# Patient Record
Sex: MALE | Race: WHITE | NOT HISPANIC OR LATINO | Employment: FULL TIME | URBAN - METROPOLITAN AREA
[De-identification: names, ages, dates, MRNs, and addresses within clinical notes are randomized per-mention and may not be internally consistent; named-entity substitution may affect disease eponyms.]

---

## 2018-09-24 ENCOUNTER — OFFICE VISIT (OUTPATIENT)
Dept: FAMILY MEDICINE | Facility: CLINIC | Age: 28
End: 2018-09-24
Payer: COMMERCIAL

## 2018-09-24 VITALS
HEART RATE: 68 BPM | TEMPERATURE: 97.7 F | WEIGHT: 163.6 LBS | SYSTOLIC BLOOD PRESSURE: 130 MMHG | HEIGHT: 71 IN | DIASTOLIC BLOOD PRESSURE: 78 MMHG | BODY MASS INDEX: 22.9 KG/M2

## 2018-09-24 DIAGNOSIS — Z23 NEED FOR VACCINATION: ICD-10-CM

## 2018-09-24 DIAGNOSIS — M54.50 ACUTE LEFT-SIDED LOW BACK PAIN WITHOUT SCIATICA: ICD-10-CM

## 2018-09-24 DIAGNOSIS — Z00.00 ENCOUNTER FOR ROUTINE ADULT HEALTH EXAMINATION WITHOUT ABNORMAL FINDINGS: Primary | ICD-10-CM

## 2018-09-24 DIAGNOSIS — Z13.6 CARDIOVASCULAR SCREENING; LDL GOAL LESS THAN 160: ICD-10-CM

## 2018-09-24 DIAGNOSIS — Z82.69 FAMILY HISTORY OF ANKYLOSING SPONDYLITIS: ICD-10-CM

## 2018-09-24 PROCEDURE — 99213 OFFICE O/P EST LOW 20 MIN: CPT | Mod: 25 | Performed by: FAMILY MEDICINE

## 2018-09-24 PROCEDURE — 90472 IMMUNIZATION ADMIN EACH ADD: CPT | Performed by: FAMILY MEDICINE

## 2018-09-24 PROCEDURE — 90686 IIV4 VACC NO PRSV 0.5 ML IM: CPT | Performed by: FAMILY MEDICINE

## 2018-09-24 PROCEDURE — 90471 IMMUNIZATION ADMIN: CPT | Performed by: FAMILY MEDICINE

## 2018-09-24 PROCEDURE — 99385 PREV VISIT NEW AGE 18-39: CPT | Mod: 25 | Performed by: FAMILY MEDICINE

## 2018-09-24 PROCEDURE — 90715 TDAP VACCINE 7 YRS/> IM: CPT | Performed by: FAMILY MEDICINE

## 2018-09-24 NOTE — PROGRESS NOTES
SUBJECTIVE:   CC: Ki Menjivar is an 27 year old male who presents for preventative health visit.     Healthy Habits:    Do you get at least three servings of calcium containing foods daily (dairy, green leafy vegetables, etc.)? yes    Amount of exercise or daily activities, outside of work: 0 day(s) per week works as a     Problems taking medications regularly No    Medication side effects: No    Have you had an eye exam in the past two years? no    Do you see a dentist twice per year? yes    Do you have sleep apnea, excessive snoring or daytime drowsiness?no       Concerns:  * back pain    Pain in the low back, hurts most at the end of the day.  Started over the last month or so.  Had a 3 week period where it hurt daily.  Was off work for a week and pain was better.  Back to work again today and now has pain.  Pain in the L low back next to the spine.  No pain in the buttock or legs.  Felt stiff in the past when it was hurting, seemed stiff in the AM.  Did not disrupt sleep.  No numbness/tingling/weakness in the legs.  First episode of back pain.    Has a family h/o ankylosing spondylitis on both sides of the family    *  Planning adacel and flu        Today's PHQ-2 Score:   PHQ-2 ( 1999 Pfizer) 9/24/2018   Q1: Little interest or pleasure in doing things 0   Q2: Feeling down, depressed or hopeless 0   PHQ-2 Score 0       Abuse: Current or Past(Physical, Sexual or Emotional)- No  Do you feel safe in your environment - Yes    Social History   Substance Use Topics     Smoking status: Never Smoker     Smokeless tobacco: Never Used     Alcohol use Yes      If you drink alcohol do you typically have >3 drinks per day or >7 drinks per week? No                      Last PSA: No results found for: PSA    Reviewed orders with patient. Reviewed health maintenance and updated orders accordingly - Yes    Reviewed and updated as needed this visit by clinical staff  Tobacco  Allergies  Meds  Med Hx  Surg Hx  Fam  "Hx  Soc Hx        Reviewed and updated as needed this visit by Provider  Tobacco  Med Hx  Surg Hx  Fam Hx  Soc Hx       History reviewed. No pertinent past medical history.   Past Surgical History:   Procedure Laterality Date     LEG SURGERY Right     femur fracture       ROS:  CONSTITUTIONAL: NEGATIVE for fever, chills, change in weight  INTEGUMENTARY/SKIN: NEGATIVE for worrisome rashes, moles or lesions  EYES: NEGATIVE for vision changes or irritation  ENT: NEGATIVE for ear, mouth and throat problems  RESP: NEGATIVE for significant cough or SOB  CV: NEGATIVE for chest pain, palpitations or peripheral edema  GI: NEGATIVE for nausea, abdominal pain, heartburn, or change in bowel habits   male: negative for dysuria, hematuria, decreased urinary stream, erectile dysfunction, urethral discharge  MUSCULOSKELETAL:as above  NEURO: NEGATIVE for weakness, dizziness or paresthesias  PSYCHIATRIC: NEGATIVE for changes in mood or affect    OBJECTIVE:   /78  Pulse 68  Temp 97.7  F (36.5  C) (Tympanic)  Ht 5' 11.25\" (1.81 m)  Wt 163 lb 9.6 oz (74.2 kg)  BMI 22.66 kg/m2  EXAM:  GENERAL: healthy, alert and no distress  EYES: Eyes grossly normal to inspection, PERRL and conjunctivae and sclerae normal  HENT: ear canals and TM's normal, nose and mouth without ulcers or lesions  NECK: no adenopathy, no asymmetry, masses, or scars and thyroid normal to palpation  RESP: lungs clear to auscultation - no rales, rhonchi or wheezes  CV: regular rate and rhythm, normal S1 S2, no S3 or S4, no murmur, click or rub, no peripheral edema and peripheral pulses strong  ABDOMEN: soft, nontender, no hepatosplenomegaly, no masses and bowel sounds normal  MS: extremities normal- no gross deformities noted.  Lumbar spine with full ROM.  Mild pain on end range extension and rotation L.  nontender on palpation of the lumbar spine and SI micheal.  Mild L paraspinal tenderness.  Able to heel and toe walk.  Muscle strength intact in LE micheal at " 5/5  SKIN: no suspicious lesions or rashes  NEURO: Normal strength and tone, mentation intact and speech normal  PSYCH: mentation appears normal, affect normal/bright    Diagnostic Test Results:  none     ASSESSMENT/PLAN:       ICD-10-CM    1. Encounter for routine adult health examination without abnormal findings Z00.00    2. Acute left-sided low back pain without sciatica M54.5 XR Lumbar Spine 2/3 Views     HLA-B27 Typing   3. Family history of ankylosing spondylitis Z82.69 XR Lumbar Spine 2/3 Views     HLA-B27 Typing   4. Need for vaccination Z23 FLU VACCINE, SPLIT VIRUS, IM (QUADRIVALENT) [52235]- >3 YRS     Vaccine Administration, Initial [10480]     Vaccine Administration, Each Additional [98074]     TDAP, IM (10 - 64 YRS) - Adacel   5. CARDIOVASCULAR SCREENING; LDL GOAL LESS THAN 160 Z13.6 Lipid panel reflex to direct LDL Fasting     Reviewed low back pain.  Would recommend at least initial eval for ankylosing spondylitis given history..  Recommend PT as treatment.  Pt declines referral, considering chiropractor.  He will consider eval as ordered.  Patient Instructions   We updated your tetanus and whooping cough (pertussis) shot today as well as your flu shot.      I think it would be fine to check into a chiropractor for your back.  I'd recommend on who is rehab/exercise focused.    I am a bit concerned with your back pain given your age and family history.  I do think it would be a good idea to evaluate more for ankylosing spondylitis.  I would recommend an x-ray of the low back and a blood test called HLA B27.  You can schedule the x-ray any time and the blood test could be done at the same time as your cholesterol.    I would recommend you contact your insurance for pricing and you can call call the [facility] at [phone]        COUNSELING:  Reviewed preventive health counseling, as reflected in patient instructions  Special attention given to:        Regular exercise       Healthy  "diet/nutrition       Immunizations    Vaccinated for: Influenza and TDAP          BP Readings from Last 1 Encounters:   09/24/18 130/78     Estimated body mass index is 22.66 kg/(m^2) as calculated from the following:    Height as of this encounter: 5' 11.25\" (1.81 m).    Weight as of this encounter: 163 lb 9.6 oz (74.2 kg).    BP Screening:   Last 3 BP Readings:    BP Readings from Last 3 Encounters:   09/24/18 130/78       The following was recommended to the patient:  Re-screen BP within a year and recommended lifestyle modifications       reports that he has never smoked. He has never used smokeless tobacco.      Counseling Resources:  ATP IV Guidelines  Pooled Cohorts Equation Calculator  FRAX Risk Assessment  ICSI Preventive Guidelines  Dietary Guidelines for Americans, 2010  [employer] MyPlate  ASA Prophylaxis  Lung CA Screening    Eliza Borges, DO  [facility]    Patient Instructions   We updated your tetanus and whooping cough (pertussis) shot today as well as your flu shot.      I think it would be fine to check into a chiropractor for your back.  I'd recommend on who is rehab/exercise focused.    I am a bit concerned with your back pain given your age and family history.  I do think it would be a good idea to evaluate more for ankylosing spondylitis.  I would recommend an x-ray of the low back and a blood test called HLA B27.  You can schedule the x-ray any time and the blood test could be done at the same time as your cholesterol.    I would recommend you contact your insurance for pricing and you can call call the [employer] at [phone]      Preventive Health Recommendations  Male Ages 26 - 39    Yearly exam:             See your health care provider every year in order to  o   Review health changes.   o   Discuss preventive care.    o   Review your medicines if your doctor has prescribed any.    You should be tested each year for STDs (sexually transmitted diseases), if " you re at risk.     After age 35, talk to your provider about cholesterol testing. If you are at risk for heart disease, have your cholesterol tested at least every 5 years.     If you are at risk for diabetes, you should have a diabetes test (fasting glucose).  Shots: Get a flu shot each year. Get a tetanus shot every 10 years.     Nutrition:    Eat at least 5 servings of fruits and vegetables daily.     Eat whole-grain bread, whole-wheat pasta and brown rice instead of white grains and rice.     Get adequate Calcium and Vitamin D.     Lifestyle    Exercise for at least 150 minutes a week (30 minutes a day, 5 days a week). This will help you control your weight and prevent disease.     Limit alcohol to one drink per day.     No smoking.     Wear sunscreen to prevent skin cancer.     See your dentist every six months for an exam and cleaning.

## 2018-09-24 NOTE — LETTER
[facility]  [address] [place] [id number]  [phone]        October 9, 2019    Ki Menjivar  [address] [place] [address]              Dear Ki Menjivar    This is to remind you that your fasting lab is due.    You may call our office at [phone] to schedule an appointment.    Please disregard this notice if you have already had your labs drawn or made an appointment.        Sincerely,        Eliza Borges DO

## 2018-09-24 NOTE — NURSING NOTE
"Initial /78  Pulse 68  Temp 97.7  F (36.5  C) (Tympanic)  Ht 5' 11.25\" (1.81 m)  Wt 163 lb 9.6 oz (74.2 kg)  BMI 22.66 kg/m2 Estimated body mass index is 22.66 kg/(m^2) as calculated from the following:    Height as of this encounter: 5' 11.25\" (1.81 m).    Weight as of this encounter: 163 lb 9.6 oz (74.2 kg). .      "

## 2018-09-24 NOTE — PATIENT INSTRUCTIONS
We updated your tetanus and whooping cough (pertussis) shot today as well as your flu shot.      I think it would be fine to check into a chiropractor for your back.  I'd recommend on who is rehab/exercise focused.    I am a bit concerned with your back pain given your age and family history.  I do think it would be a good idea to evaluate more for ankylosing spondylitis.  I would recommend an x-ray of the low back and a blood test called HLA B27.  You can schedule the x-ray any time and the blood test could be done at the same time as your cholesterol.    I would recommend you contact your insurance for pricing and you can call call the [employer] at [phone]      Preventive Health Recommendations  Male Ages 26 - 39    Yearly exam:             See your health care provider every year in order to  o   Review health changes.   o   Discuss preventive care.    o   Review your medicines if your doctor has prescribed any.    You should be tested each year for STDs (sexually transmitted diseases), if you re at risk.     After age 35, talk to your provider about cholesterol testing. If you are at risk for heart disease, have your cholesterol tested at least every 5 years.     If you are at risk for diabetes, you should have a diabetes test (fasting glucose).  Shots: Get a flu shot each year. Get a tetanus shot every 10 years.     Nutrition:    Eat at least 5 servings of fruits and vegetables daily.     Eat whole-grain bread, whole-wheat pasta and brown rice instead of white grains and rice.     Get adequate Calcium and Vitamin D.     Lifestyle    Exercise for at least 150 minutes a week (30 minutes a day, 5 days a week). This will help you control your weight and prevent disease.     Limit alcohol to one drink per day.     No smoking.     Wear sunscreen to prevent skin cancer.     See your dentist every six months for an exam and cleaning.

## 2018-09-24 NOTE — MR AVS SNAPSHOT
After Visit Summary   9/24/2018    Ki Menjivar    MRN: [id number]           Patient Information     Date Of Birth          1990        Visit Information        Provider Department      9/24/2018 6:20 PM Eliza Borges, DO [facility]        Today's Diagnoses     Encounter for routine adult health examination without abnormal findings    -  1    CARDIOVASCULAR SCREENING; LDL GOAL LESS THAN 160        Need for vaccination        Acute left-sided low back pain without sciatica        Family history of ankylosing spondylitis          Care Instructions    We updated your tetanus and whooping cough (pertussis) shot today as well as your flu shot.      I think it would be fine to check into a chiropractor for your back.  I'd recommend on who is rehab/exercise focused.    I am a bit concerned with your back pain given your age and family history.  I do think it would be a good idea to evaluate more for ankylosing spondylitis.  I would recommend an x-ray of the low back and a blood test called HLA B27.  You can schedule the x-ray any time and the blood test could be done at the same time as your cholesterol.    I would recommend you contact your insurance for pricing and you can call call the [email or web address] at [phone]      Preventive Health Recommendations  Male Ages 26 - 39    Yearly exam:             See your health care provider every year in order to  o   Review health changes.   o   Discuss preventive care.    o   Review your medicines if your doctor has prescribed any.    You should be tested each year for STDs (sexually transmitted diseases), if you re at risk.     After age 35, talk to your provider about cholesterol testing. If you are at risk for heart disease, have your cholesterol tested at least every 5 years.     If you are at risk for diabetes, you should have a diabetes test (fasting glucose).  Shots: Get a flu shot each year. Get a tetanus shot every 10  "years.     Nutrition:    Eat at least 5 servings of fruits and vegetables daily.     Eat whole-grain bread, whole-wheat pasta and brown rice instead of white grains and rice.     Get adequate Calcium and Vitamin D.     Lifestyle    Exercise for at least 150 minutes a week (30 minutes a day, 5 days a week). This will help you control your weight and prevent disease.     Limit alcohol to one drink per day.     No smoking.     Wear sunscreen to prevent skin cancer.     See your dentist every six months for an exam and cleaning.             Follow-ups after your visit        Future tests that were ordered for you today     Open Future Orders        Priority Expected Expires Ordered    XR Lumbar Spine 2/3 Views Routine 9/24/2018 9/24/2019 9/24/2018    HLA-B27 Typing Routine 9/25/2018 9/24/2019 9/24/2018    Lipid panel reflex to direct LDL Fasting Routine 9/25/2018 9/24/2019 9/24/2018            Who to contact     Normal or non-critical lab and imaging results will be communicated to you by [employer], letter or phone within 4 business days after the clinic has received the results. If you do not hear from us within 7 days, please contact the clinic through [employer] or phone. If you have a critical or abnormal lab result, we will notify you by phone as soon as possible.  Submit refill requests through [employer] or call your pharmacy and they will forward the refill request to us. Please allow 3 business days for your refill to be completed.          If you need to speak with a  for additional information , please call: [phone]           Additional Information About Your Visit        [employer] Information     [employer] lets you send messages to your doctor, view your test results, renew your prescriptions, schedule appointments and more. To sign up, go to [email or web address] . Click on \"Log in\" on the left side of the screen, which will take you to the Welcome page. Then click on \"Sign up Now\" on the right " "side of the page.     You will be asked to enter the access code listed below, as well as some personal information. Please follow the directions to create your username and password.     Your access code is: [email or web address]  Expires: 2018  7:06 PM     Your access code will  in 90 days. If you need help or a new code, please call your [facility] clinic or [phone].        Care EveryWhere ID     This is your Care EveryWhere ID. This could be used by other organizations to access your [facility] medical records  [id number]        Your Vitals Were     Pulse Temperature Height BMI (Body Mass Index)          68 97.7  F (36.5  C) (Tympanic) 5' 11.25\" (1.81 m) 22.66 kg/m2         Blood Pressure from Last 3 Encounters:   18 130/78    Weight from Last 3 Encounters:   18 163 lb 9.6 oz (74.2 kg)              We Performed the Following     FLU VACCINE, SPLIT VIRUS, IM (QUADRIVALENT) [49417]- >3 YRS     TDAP, IM (10 - 64 YRS) - Adacel     Vaccine Administration, Each Additional [84309]     Vaccine Administration, Initial [62870]        Primary Care Provider Office Phone # Fax #    [facility] [phone] [address] [address]        Equal Access to Services     SHAN FOX AH: Hadii aad ku hadasho Soelmiraali, waaxda luqadaha, qaybta kaalmada adeegyada, hakeem taylor. So [facility] [phone].    ATENCIÓN: Si habla español, tiene a clements disposición servicios gratuitos de asistencia lingüística. Radha al [phone].    We comply with applicable federal civil rights laws and [place] laws. We do not discriminate on the basis of race, color, national origin, age, disability, sex, sexual orientation, or gender identity.            Thank you!     Thank you for choosing [facility]  for your care. Our goal is always to provide you with excellent care. Hearing back from our patients is one way we can continue to improve our " services. Please take a few minutes to complete the written survey that you may receive in the mail after your visit with us. Thank you!             Your Updated Medication List - Protect others around you: Learn how to safely use, store and throw away your medicines at www.disposemymeds.org.      Notice  As of 9/24/2018  7:06 PM    You have not been prescribed any medications.

## 2018-09-25 NOTE — PROGRESS NOTES

## 2019-12-06 ENCOUNTER — TELEPHONE (OUTPATIENT)
Dept: LAB | Facility: CLINIC | Age: 29
End: 2019-12-06

## 2019-12-06 NOTE — TELEPHONE ENCOUNTER
Labs on Ki Menjivar has  and a letter was sent on 10/9/19 . Please contact the patient. Thank you!

## 2020-03-10 ENCOUNTER — HEALTH MAINTENANCE LETTER (OUTPATIENT)
Age: 30
End: 2020-03-10

## 2020-08-01 ENCOUNTER — TRANSFERRED RECORDS (OUTPATIENT)
Dept: HEALTH INFORMATION MANAGEMENT | Facility: CLINIC | Age: 30
End: 2020-08-01

## 2020-08-31 ENCOUNTER — VIRTUAL VISIT (OUTPATIENT)
Dept: FAMILY MEDICINE | Facility: OTHER | Age: 30
End: 2020-08-31
Payer: COMMERCIAL

## 2020-08-31 PROCEDURE — 99421 OL DIG E/M SVC 5-10 MIN: CPT | Performed by: PHYSICIAN ASSISTANT

## 2020-09-01 ENCOUNTER — AMBULATORY - HEALTHEAST (OUTPATIENT)
Dept: FAMILY MEDICINE | Facility: CLINIC | Age: 30
End: 2020-09-01

## 2020-09-01 DIAGNOSIS — Z20.822 SUSPECTED COVID-19 VIRUS INFECTION: ICD-10-CM

## 2020-09-02 NOTE — PROGRESS NOTES
"Date: 2020 20:07:22  Clinician: Trista King  Clinician NPI: [id number]  Patient: Ki Hadley  Patient : 1990  Patient Address: [address] [place] [address]  Patient Phone: [phone]  Visit Protocol: URI  Patient Summary:  Ki is a 29 year old ( : 1990 ) male who initiated a Visit for COVID-19 (Coronavirus) evaluation and screening. When asked the question \"Please sign me up to receive news, health information and promotions from [employer].\", Ki responded \"No\".    Ki states his symptoms started 1-2 days ago.   His symptoms consist of a headache, chills, malaise, a cough, nasal congestion, and myalgia. Ki also feels feverish.   Symptom details     Nasal secretions: The color of his mucus is clear.    Cough: Ki coughs a few times an hour and his cough is not more bothersome at night. Phlegm does not come into his throat when he coughs. He does not believe his cough is caused by post-nasal drip.     Temperature: His current temperature is 98.5 degrees Fahrenheit.     Headache: He states the headache is mild (1-3 on a 10 point pain scale).      Ki denies having ear pain, enlarged lymph nodes, wheezing, sore throat, teeth pain, ageusia, diarrhea, vomiting, rhinitis, nausea, anosmia, and facial pain or pressure. He also denies having a sinus infection within the past year, having recent facial or sinus surgery in the past 60 days, and taking antibiotic medication in the past month. He is not experiencing dyspnea.   Precipitating events  He has recently been exposed to someone with influenza. Ki has been in close contact with the following high risk individuals: children under the age of 5.   Pertinent COVID-19 (Coronavirus) information  In the past 14 days, Ki has not worked in a congregate living setting.   He does not work or volunteer as healthcare worker or a  and does not work or volunteer in a healthcare facility.   Ki also has not lived in " a congregate living setting in the past 14 days. He lives with a healthcare worker.   Ki has not had a close contact with a laboratory-confirmed COVID-19 patient within 14 days of symptom onset.   Since December 2019, Ki and has not had upper respiratory infection or influenza-like illness. Has not been diagnosed with lab-confirmed COVID-19 test   Pertinent medical history  Ki needs a return to work/school note.   Weight: 160 lbs   Ki does not smoke or use smokeless tobacco.   Additional information as reported by the patient (free text): A cold has been going around my daughter's . She had a mild fever (99.2) Friday night, along with a runny nose (clear snot). Her fever was gone the next morning.   Weight: 160 lbs  Reason for repeat visit for the same protocol within 24 hours:  I haven't started a previous visit before. I am having technology problems.  See the History of referred by protocol and completed visits section for details on previous visits (visits currently in queue to be diagnosed will not appear in this section).    MEDICATIONS: No current medications, ALLERGIES: NKDA  Clinician Response:  Dear Ki,  Based on the information provided, you have a viral upper respiratory infection, otherwise known as a cold. Symptoms vary from person to person, but can include sneezing, coughing, a runny nose, sore throat, and headache and range from mild to severe.  Unfortunately, there are no medications that can cure a cold, so treatment is focused on controlling symptoms as much as possible. Most people gradually feel better until symptoms are gone in 1-2 weeks.  Medication information  Because you have a viral infection, antibiotics will not help you get better. Treating a viral infection with antibiotics could actually make you feel worse.  Unless you are allergic to the over-the-counter medication(s) below, I recommend using:       Acetaminophen (Tylenol or store brand) oral tablet. Take 1-2  tablets by mouth every 4-6 hours to help with the discomfort.      Ibuprofen (Advil or store brand) 200 mg oral tablet. Take 1-3 tablets (200-600 mg) by mouth every 8 hours to help with the discomfort. Make sure to take the ibuprofen with food. Do not exceed 2400 mg in 24 hours.    A decongestant such as Sudafed PE or store brand.     Over-the-counter medications do not require a prescription. Ask the pharmacist if you have any questions.  Self care  Steps you can take to be as comfortable as possible:     Rest.    Drink plenty of fluids.    Take a warm shower to loosen congestion    Use a cool-mist humidifier.    Take a spoonful of honey to reduce your cough.     When to seek care  Please be seen in a clinic or urgent care if any of the following occur:   New symptoms develop, or symptoms become worse   Call ahead before going to the clinic or urgent care.  Additional treatment plan   Your symptoms show that you may have coronavirus (COVID-19). This illness can cause fever, cough and trouble breathing. Many people get a mild case and get better on their own. Some people can get very sick.  What should I do?  We would like to test you for this virus.   1. Please call [phone] to schedule your visit. Explain that [facility] have [facility] COVID-19 test. Be ready to share your [facility] visit ID number.  The following will serve as your written order for this COVID Test, ordered by me, for the indication of suspected COVID [Z20.828]: The test will be ordered in [employer], our electronic health record, after you are scheduled. It will show as ordered and authorized by Anand Zamudio MD.  Order: COVID-19 (Coronavirus) PCR for SYMPTOMATIC testing from [facility].      2. When it's time for your COVID test:  Stay at least 6 feet away from others. (If someone will drive you to your test, stay in the backseat, as far away from the  as you can.)   Cover your mouth and nose with a mask, tissue or washcloth.  Go  "straight to the testing site. Don't make any stops on the way there or back.      3.Starting now: Stay home and away from others (self-isolate) until:   You've had no fever---and no medicine that reduces fever---for one full day (24 hours). And...   Your other symptoms have gotten better. For example, your cough or breathing has improved. And...   At least 10 days have passed since your symptoms started.       During this time, don't leave the house except for testing or medical care.   Stay in your own room, even for meals. Use your own bathroom if you can.   Stay away from others in your home. No hugging, kissing or shaking hands. No visitors.  Don't go to work, school or anywhere else.    Clean \"high touch\" surfaces often (doorknobs, counters, handles, etc.). Use a household cleaning spray or wipes. You'll find a full list of  on the EPA website: www.epa.gov/pesticide-registration/list-n-disinfectants-use-against-sars-cov-2.   Cover your mouth and nose with a mask, tissue or washcloth to avoid spreading germs.  Wash your hands and face often. Use soap and water.  Caregivers in these groups are at risk for severe illness due to COVID-19:  o People 65 years and older  o People who live in a nursing home or long-term care facility  o People with chronic disease (lung, heart, cancer, diabetes, kidney, liver, immunologic)  o People who have a weakened immune system, including those who:   Are in cancer treatment  Take medicine that weakens the immune system, such as corticosteroids  Had a bone marrow or organ transplant  Have an immune deficiency  Have poorly controlled HIV or AIDS  Are obese (body mass index of 40 or higher)  Smoke regularly   o Caregivers should wear gloves while washing dishes, handling laundry and cleaning bedrooms and bathrooms.  o Use caution when washing and drying laundry: Don't shake dirty laundry, and use the warmest water setting that you can.  o For more tips, go to " www.cdc.gov/coronavirus/2019-ncov/downloads/10Things.pdf.    4.Sign up for [employer]. We know it's scary to hear that you might have COVID-19. We want to track your symptoms to make sure you're okay over the next 2 weeks. Please look for an email from [employer]---this is a free, online program that we'll use to keep in touch. To sign up, follow the link in the email. Learn more at [email or web address]  How can I take care of myself?   Get lots of rest. Drink extra fluids (unless a doctor has told you not to).   Take Tylenol (acetaminophen) for fever or pain. If you have liver or kidney problems, ask your family doctor if it's okay to take Tylenol.   Adults can take either:    650 mg (two 325 mg pills) every 4 to 6 hours, or...   1,000 mg (two 500 mg pills) every 8 hours as needed.    Note: Don't take more than 3,000 mg in one day. Acetaminophen is found in many medicines (both prescribed and over-the-counter medicines). Read all labels to be sure you don't take too much.   For children, check the Tylenol bottle for the right dose. The dose is based on the child's age or weight.    If you have other health problems (like cancer, heart failure, an organ transplant or severe kidney disease): Call your specialty clinic if you don't feel better in the next 2 days.       Know when to call 911. Emergency warning signs include:    Trouble breathing or shortness of breath Pain or pressure in the chest that doesn't go away Feeling confused like you haven't felt before, or not being able to wake up Bluish-colored lips or face.  Where can I get more information?    [facility] -- About COVID-19: [email or web address]   CDC -- What to Do If You're Sick: www.cdc.gov/coronavirus/2019-ncov/about/steps-when-sick.html   CDC -- Ending Home Isolation: www.cdc.gov/coronavirus/2019-ncov/hcp/disposition-in-home-patients.html   CDC -- Caring for Someone:  www.cdc.gov/coronavirus/2019-ncov/if-you-are-sick/care-for-someone.html   [facility] -- Interim Guidance for Hospital Discharge to Home: www.health.[facility].mn.us/diseases/coronavirus/hcp/hospdischarge.pdf   [facility] clinical trials (COVID-19 research studies): clinicalaffairs.[facility]-clinical-trials    Below are the COVID-19 hotlines at the [place] Department of Health ([facility]). Interpreters are available.    For health questions: Call 010-308-3467 or 1-699.137.9210 (7 a.m. to 7 p.m.) For questions about schools and childcare: Call 751-016-8964 or 1-718.580.6419 (7 a.m. to 7 p.m.)    Diagnosis: Nasal congestion  Diagnosis ICD: R09.81

## 2020-11-10 ENCOUNTER — VIRTUAL VISIT (OUTPATIENT)
Dept: FAMILY MEDICINE | Facility: OTHER | Age: 30
End: 2020-11-10
Payer: COMMERCIAL

## 2020-11-10 PROCEDURE — 99421 OL DIG E/M SVC 5-10 MIN: CPT | Performed by: PHYSICIAN ASSISTANT

## 2020-11-10 NOTE — PROGRESS NOTES
"Date: 11/10/2020 10:14:57  Clinician: Juan Mason  Clinician NPI: [id number]  Patient: Ki Hadley  Patient : 1990  Patient Address: [address] [place] [id number]  Patient Phone: [phone]  Visit Protocol: URI  Patient Summary:  Ki is a 29 year old ( : 1990 ) male who initiated a [facility] Visit for COVID-19 (Coronavirus) evaluation and screening. When asked the question \"Please sign me up to receive news, health information and promotions from [facility].\", Ki responded \"Yes\".    When asked when his symptoms started, Ki reported that he does not have any symptoms.   He denies taking antibiotic medication in the past month and having recent facial or sinus surgery in the past 60 days.    Pertinent COVID-19 (Coronavirus) information  Ki does not work or volunteer as healthcare worker or a . In the past 14 days, Ki has not worked or volunteered at a healthcare facility or group living setting.   In the past 14 days, he also has not lived in a congregate living setting.   Ki has had a close contact with a laboratory-confirmed COVID-19 patient in the last 14 days. He was not exposed at his work. Date Ki was exposed to the laboratory-confirmed COVID-19 patient: 2020   Additional information about contact with COVID-19 (Coronavirus) patient as reported by the patient (free text): brother in law at his house   Ki is not living in the same household with the COVID-19 positive patient. He was in an enclosed space for greater than 15 minutes with the COVID-19 patient.   During the encounter, neither were wearing masks.   Since 2019, Ki has been tested for COVID-19 and has had upper respiratory infection (URI) or influenza-like illness.      Result of COVID-19 test: Negative     Date of his COVID-19 test: 08/10/2020     Date(s) of previous URI or influenza-like illness (free-text): 8/10/2020     Symptoms Ki experienced during previous URI or " influenza-like illness as reported by the patient (free-text): runny nose, lack of energy, cough        Pertinent medical history  Ki does not need a return to work/school note.   Weight: 165 lbs   Ki does not smoke or use smokeless tobacco.   Additional information as reported by the patient (free text): Daughter had a fever Friday through Sunday (11/6-8) but has gotten better since then.   Weight: 165 lbs    MEDICATIONS: No current medications, ALLERGIES: NKDA  Clinician Response:  Dear Ki,   Based on your exposure to COVID-19 (coronavirus), we would like to test you for this virus.  1. Please call [phone] to schedule your visit. Explain that you were referred by [facility] to have a COVID-19 test. Be ready to share your [facility] visit ID number.  * If you need to schedule in [facility] please call [phone] or for [employer] employees please call [phone].   * If you need to schedule in the [place] area please call [phone]. [employer] employees call [phone].   The following will serve as your written order for this COVID Test, ordered by me, for the indication of suspected COVID [Z20.828]: The test will be ordered in [employer], our electronic health record, after you are scheduled. It will show as ordered and authorized by Anand Zamudio MD.  Order: COVID-19 (coronavirus) PCR for ASYMPTOMATIC EXPOSURE testing from [facility].   If you know you have had close contact with someone who tested positive, you should be quarantined for 14 days after this exposure. You should stay in quarantine for the14 days even if the covid test is negative, the optimal time to test after exposure is 5-7 days from the exposure  Quarantine means   What should I do?  For safety, it's very important to follow these rules. Do this for 14 days after the date you were last exposed to the virus..  Stay home and away from others. Don't go to school or anywhere else. Generally quarantine means staying home from work but there are  some exceptions to this. Please contact your workplace.   No hugging, kissing or shaking hands.  Don't let anyone visit.  Cover your mouth and nose with a mask, tissue or washcloth to avoid spreading germs.  Wash your hands and face often. Use soap and water.  What are the symptoms of COVID-19?  The most common symptoms are cough, fever and trouble breathing. Less common symptoms include headache, body aches, fatigue (feeling very tired), chills, sore throat, stuffy or runny nose, diarrhea (loose poop), loss of taste or smell, belly pain, and nausea or vomiting (feeling sick to your stomach or throwing up).  After 14 days, if you have still don't have symptoms, you likely don't have this virus.  If you develop symptoms, follow these guidelines.  If you're normally healthy: Please start another [facility] visit to report your symptoms. Go to [email or web address].  If you have a serious health problem (like cancer, heart failure, an organ transplant or kidney disease): Call your specialty clinic. Let them know that you might have COVID-19.  2. When it's time for your COVID test:  Stay at least 6 feet away from others. (If someone will drive you to your test, stay in the backseat, as far away from the  as you can.)  Cover your mouth and nose with a mask, tissue or washcloth.  Go straight to the testing site. Don't make any stops on the way there or back.  Please note  Caregivers in these groups are at risk for severe illness due to COVID-19:  o People 65 years and older  o People who live in a nursing home or long-term care facility  o People with chronic disease (lung, heart, cancer, diabetes, kidney, liver, immunologic)  o People who have a weakened immune system, including those who:  Are in cancer treatment  Take medicine that weakens the immune system, such as corticosteroids  Had a bone marrow or organ transplant  Have an immune deficiency  Have poorly controlled HIV or AIDS  Are obese (body mass index of 40 or higher)   Smoke regularly  Where can I get more information?  [facility] -- About COVID-19: [email or web address]  CDC -- What to Do If You're Sick: www.cdc.gov/coronavirus/2019-ncov/about/steps-when-sick.html  CDC -- Ending Home Isolation: www.cdc.gov/coronavirus/2019-ncov/hcp/disposition-in-home-patients.html  [facility] -- Caring for Someone: www.cdc.gov/coronavirus/2019-ncov/if-you-are-sick/care-for-someone.html  [facility] -- Interim Guidance for Hospital Discharge to Home: www.[facility].mn.us/diseases/coronavirus/hcp/hospdischarge.pdf  [facility] clinical trials (COVID-19 research studies): clinicalaffairs.[facility]-clinical-trials  Below are the COVID-19 hotlines at the [place] Department of Health ([facility]). Interpreters are available.  For health questions: Call 147-239-5124 or 1-289.579.8040 (7 a.m. to 7 p.m.)  For questions about schools and childcare: Call 968-850-1006 or 1-577.269.1054 (7 a.m. to 7 p.m.)    Diagnosis: Contact with and (suspected) exposure to other viral communicable diseases  Diagnosis ICD: Z20.828

## 2020-12-01 ENCOUNTER — VIRTUAL VISIT (OUTPATIENT)
Dept: URGENT CARE | Facility: CLINIC | Age: 30
End: 2020-12-01
Payer: COMMERCIAL

## 2020-12-01 DIAGNOSIS — Z20.822 EXPOSURE TO COVID-19 VIRUS: Primary | ICD-10-CM

## 2020-12-01 PROCEDURE — 99213 OFFICE O/P EST LOW 20 MIN: CPT | Mod: TEL | Performed by: INTERNAL MEDICINE

## 2020-12-01 NOTE — LETTER
December 1, 2020      Ki Menjivar  [address] [place] [address]        To Whom It May Concern:    Ki Menjivar was evaluated by our clinic. He may return to work without restrictions on 12/2/2020.      Sincerely,      Viky Cobb MD

## 2020-12-02 NOTE — PATIENT INSTRUCTIONS
Ki has been evaluated by our clinic and may return to work with no restrictions on 12/2/2020.      -Viky Cobb MD

## 2020-12-02 NOTE — PROGRESS NOTES
"  Ki Menjivar is a 29 year old male who is being evaluated via a billable telephone visit.      The patient has been notified of following:     \"This telephone visit will be conducted via a phone call between you and your physician/provider. We have found that certain health care needs can be provided without the need for a physical exam.  This service lets us provide the care you need with a phone conversation.  If a prescription is necessary we can send it directly to your pharmacy.  If lab work is needed we can place an order for that and you can then stop by our lab to have the test done at a later time.    If during the course of the call the physician/provider feels a phone visit is not appropriate, you will not be charged for this service.\"     Patient has given verbal consent for telephone visit?  Yes    SUBJECTIVE:  Ki Menjivar is an 29 year old male who presents for beginning of November wife got covid, so he was exposed.  Did isolation since 11/9/2020 and needs note to come back to work.  He had a test which was negative for covid initially when wife got covid and then again 2 weeks later.  Not having any sxs.  No cough or runny nose or fevers.  No body aches.    PMH:   has no past medical history on file.  Patient Active Problem List   Diagnosis     Family history of ankylosing spondylitis     Social History     Socioeconomic History     Marital status:      Spouse name: Not on file     Number of children: Not on file     Years of education: Not on file     Highest education level: Not on file   Occupational History     Not on file   Social Needs     Financial resource strain: Not on file     Food insecurity     Worry: Not on file     Inability: Not on file     Transportation needs     Medical: Not on file     Non-medical: Not on file   Tobacco Use     Smoking status: Never Smoker     Smokeless tobacco: Never Used   Substance and Sexual Activity     Alcohol use: Yes     Drug use: No     Sexual " activity: Yes     Partners: Female   Lifestyle     Physical activity     Days per week: Not on file     Minutes per session: Not on file     Stress: Not on file   Relationships     Social connections     Talks on phone: Not on file     Gets together: Not on file     Attends Islam service: Not on file     Active member of club or organization: Not on file     Attends meetings of clubs or organizations: Not on file     Relationship status: Not on file     Intimate partner violence     Fear of current or ex partner: Not on file     Emotionally abused: Not on file     Physically abused: Not on file     Forced sexual activity: Not on file   Other Topics Concern     Parent/sibling w/ CABG, MI or angioplasty before 65F 55M? Not Asked   Social History Narrative     Not on file     Family History   Problem Relation Age of Onset     Ankylosing Spondylitis Paternal Grandmother      Diabetes Paternal Grandfather      Coronary Artery Disease Maternal Grandfather      Ankylosing Spondylitis Other      Colon Cancer No family hx of      Prostate Cancer No family hx of        ALLERGIES:  Patient has no known allergies.    No current outpatient medications on file.     No current facility-administered medications for this visit.          ROS:  ROS is done and is negative for general/constitutional, eye, ENT, Respiratory, cardiovascular, GI, , Skin, musculoskeletal except as noted elsewhere.  All other review of systems negative except as noted elsewhere.      OBJECTIVE:  There were no vitals taken for this visit.  GENERAL : Alert and oriented.  Did not seem to be in distress.   RESP: No respiratory distress detected during phone conversation         ASSESSMENT/PLAN:    ASSESSMENT / PLAN:  (Z20.828) Exposure to COVID-19 virus  (primary encounter diagnosis)  Comment: wife had covid in early November.  Plan: work note done for pt and advised to access on [employer].          See [facility] for orders, medications, letters, patient  instructions    Viky Cobb MD  12/1/2020, 6:07 PM      Phone call duration:  7 minutes

## 2020-12-21 ENCOUNTER — OFFICE VISIT (OUTPATIENT)
Dept: FAMILY MEDICINE | Facility: CLINIC | Age: 30
End: 2020-12-21
Payer: COMMERCIAL

## 2020-12-21 VITALS
BODY MASS INDEX: 23.43 KG/M2 | WEIGHT: 173 LBS | HEART RATE: 66 BPM | OXYGEN SATURATION: 97 % | HEIGHT: 72 IN | RESPIRATION RATE: 16 BRPM | SYSTOLIC BLOOD PRESSURE: 124 MMHG | TEMPERATURE: 96.8 F | DIASTOLIC BLOOD PRESSURE: 70 MMHG

## 2020-12-21 DIAGNOSIS — F41.9 ANXIETY: ICD-10-CM

## 2020-12-21 DIAGNOSIS — G47.00 INSOMNIA, UNSPECIFIED TYPE: ICD-10-CM

## 2020-12-21 DIAGNOSIS — Z00.00 ROUTINE GENERAL MEDICAL EXAMINATION AT A HEALTH CARE FACILITY: Primary | ICD-10-CM

## 2020-12-21 PROCEDURE — 99213 OFFICE O/P EST LOW 20 MIN: CPT | Mod: 25 | Performed by: FAMILY MEDICINE

## 2020-12-21 PROCEDURE — 99395 PREV VISIT EST AGE 18-39: CPT | Performed by: FAMILY MEDICINE

## 2020-12-21 ASSESSMENT — ANXIETY QUESTIONNAIRES
6. BECOMING EASILY ANNOYED OR IRRITABLE: MORE THAN HALF THE DAYS
1. FEELING NERVOUS, ANXIOUS, OR ON EDGE: MORE THAN HALF THE DAYS
IF YOU CHECKED OFF ANY PROBLEMS ON THIS QUESTIONNAIRE, HOW DIFFICULT HAVE THESE PROBLEMS MADE IT FOR YOU TO DO YOUR WORK, TAKE CARE OF THINGS AT HOME, OR GET ALONG WITH OTHER PEOPLE: SOMEWHAT DIFFICULT
2. NOT BEING ABLE TO STOP OR CONTROL WORRYING: SEVERAL DAYS
7. FEELING AFRAID AS IF SOMETHING AWFUL MIGHT HAPPEN: SEVERAL DAYS
GAD7 TOTAL SCORE: 9
5. BEING SO RESTLESS THAT IT IS HARD TO SIT STILL: SEVERAL DAYS
3. WORRYING TOO MUCH ABOUT DIFFERENT THINGS: SEVERAL DAYS

## 2020-12-21 ASSESSMENT — PATIENT HEALTH QUESTIONNAIRE - PHQ9
5. POOR APPETITE OR OVEREATING: SEVERAL DAYS
SUM OF ALL RESPONSES TO PHQ QUESTIONS 1-9: 13

## 2020-12-21 ASSESSMENT — MIFFLIN-ST. JEOR: SCORE: 1791.69

## 2020-12-21 NOTE — PROGRESS NOTES
SUBJECTIVE:   CC: Ki Menjivar is an 30 year old male who presents for preventative health visit.     Patient has been advised of split billing requirements and indicates understanding: Yes  Healthy Habits:    Getting at least 3 servings of Calcium per day:  Yes    Bi-annual eye exam:  NO    Dental care twice a year:  Yes    Sleep apnea or symptoms of sleep apnea:  Daytime drowsiness    Diet:  Regular (no restrictions)    Frequency of exercise:  None    Duration of exercise:  N/A    Taking medications regularly:  Not Applicable    Barriers to taking medications:  Not applicable    Medication side effects:  Not applicable    PHQ-2 Total Score:    Additional concerns today:  Yes (Mental Health and Sleep issues.)  - Patient sees a therapist [facility] in [place] and [facility] in [facility].  - If time patient has some other problems he would like to address. Back pain and gut issues.    Abnormal Mood Symptoms  Onset: ongoing for about 2 years. Patient sees two therapist. Sexual health therapist and Family therapist.    Description:   Depression: YES  Anxiety: YES    Accompanying Signs & Symptoms:  Still participating in activities that you used to enjoy: YES  Fatigue: YES  Irritability: YES- slightly  Difficulty concentrating: YES- sometimes  Changes in appetite: no  Problems with sleep: YES- falling asleep and throughout the day he is exhausted.  Heart racing/beating fast : YES- sometimes  Thoughts of hurting yourself or others: none    History:   Recent stress: YES- work.  Prior depression hospitalization: in the past yes.  Family history of depression: no  Family history of anxiety: no    Precipitating factors:   Alcohol/drug use: no    Alleviating factors:  none    Therapies Tried and outcome: None    Last year had bigger depression episode, which isn't as big an issue now.    No elisa, no OCD, no auditory or visual hallucinations.    No prior history of medications.      Insomnia  Onset: x  years (10+)    Description:   Time to fall asleep (sleep latency):  Up about 2 hours but sometimes it can be minutes depending on the day.  Middle of night awakening:  YES- but has a daughter that wakes up in the middle of the night so he is not necessarily waking up his self.   Early morning awakening:  no    Progression of Symptoms:  worsening    Accompanying Signs & Symptoms:  Daytime sleepiness/napping: YES  Excessive snoring/apnea: no  Restless legs: YES- from time to time  Frequent urination: YES  Chronic pain:  YES- does work a really physical job Back pain.    History:  Prior Insomnia: YES- from quite a while.    Precipitating factors:   New stressful situation: no  Caffeine intake: YES- Soda. Can be pretty excessive at times.  OTC decongestants: no  Any new medications: YES- Melatonin and Vitamin D.    Alleviating factors:  Self medicating (alcohol, etc.):  no    Therapies Tried and outcome: Melatonin     Hours of sleep 4-6 hours of sleep average, one year ago even less.    Today's PHQ-2 Score:   PHQ-2 ( 1999 Pfizer) 9/24/2018   Q1: Little interest or pleasure in doing things 0   Q2: Feeling down, depressed or hopeless 0   PHQ-2 Score 0       Abuse: Current or Past(Physical, Sexual or Emotional)- No  Do you feel safe in your environment? Yes        Social History     Tobacco Use     Smoking status: Never Smoker     Smokeless tobacco: Never Used   Substance Use Topics     Alcohol use: Yes     If you drink alcohol do you typically have >3 drinks per day or >7 drinks per week? No    No flowsheet data found.No flowsheet data found.    Last PSA: No results found for: PSA    Reviewed orders with patient. Reviewed health maintenance and updated orders accordingly - Yes  BP Readings from Last 3 Encounters:   12/21/20 124/70   09/24/18 130/78    Wt Readings from Last 3 Encounters:   12/21/20 78.5 kg (173 lb)   09/24/18 74.2 kg (163 lb 9.6 oz)                    Reviewed and updated as needed this visit by clinical  "staff  Tobacco  Allergies  Meds   Med Hx  Surg Hx  Fam Hx  Soc Hx        Reviewed and updated as needed this visit by Provider                    Review of Systems  CONSTITUTIONAL: NEGATIVE for fever, chills, change in weight  INTEGUMENTARY/SKIN: NEGATIVE for worrisome rashes, moles or lesions  EYES: NEGATIVE for vision changes or irritation  ENT: NEGATIVE for ear, mouth and throat problems  RESP: NEGATIVE for significant cough or SOB  CV: NEGATIVE for chest pain, palpitations or peripheral edema  GI: NEGATIVE for nausea, abdominal pain, heartburn, or change in bowel habits   male: negative for dysuria, hematuria, decreased urinary stream, erectile dysfunction, urethral discharge  MUSCULOSKELETAL: NEGATIVE for significant arthralgias or myalgia  NEURO: NEGATIVE for weakness, dizziness or paresthesias  PSYCHIATRIC: NEGATIVE for changes in mood or affect    OBJECTIVE:   /70   Pulse 66   Temp 96.8  F (36  C) (Tympanic)   Resp 16   Ht 1.835 m (6' 0.25\")   Wt 78.5 kg (173 lb)   SpO2 97%   BMI 23.30 kg/m      Physical Exam  GENERAL: healthy, alert and no distress  EYES: Eyes grossly normal to inspection, PERRL and conjunctivae and sclerae normal  HENT: ear canals and TM's normal, nose and mouth without ulcers or lesions  NECK: no adenopathy, no asymmetry, masses, or scars and thyroid normal to palpation  RESP: lungs clear to auscultation - no rales, rhonchi or wheezes  CV: regular rate and rhythm, normal S1 S2, no S3 or S4, no murmur, click or rub, no peripheral edema and peripheral pulses strong  ABDOMEN: soft, nontender, no hepatosplenomegaly, no masses and bowel sounds normal  MS: no gross musculoskeletal defects noted, no edema  SKIN: no suspicious lesions or rashes  NEURO: Normal strength and tone, mentation intact and speech normal  PSYCH: mentation appears normal, affect normal/bright    Diagnostic Test Results:  Labs reviewed in Epic    ASSESSMENT/PLAN:   Ki was seen today for " "physical.    Diagnoses and all orders for this visit:    Routine general medical examination at a health care facility    Insomnia, unspecified type: discussed the online CBT    Anxiety: discussed and plan therapy CBT        Patient has been advised of split billing requirements and indicates understanding: Yes  COUNSELING:   Reviewed preventive health counseling, as reflected in patient instructions       Regular exercise       Healthy diet/nutrition       Vision screening    Estimated body mass index is 23.3 kg/m  as calculated from the following:    Height as of this encounter: 1.835 m (6' 0.25\").    Weight as of this encounter: 78.5 kg (173 lb).     Wt Readings from Last 4 Encounters:   12/21/20 78.5 kg (173 lb)   09/24/18 74.2 kg (163 lb 9.6 oz)           He reports that he has never smoked. He has never used smokeless tobacco.      Counseling Resources:  ATP IV Guidelines  Pooled Cohorts Equation Calculator  FRAX Risk Assessment  ICSI Preventive Guidelines  Dietary Guidelines for Americans, 2010  USDA's MyPlate  ASA Prophylaxis  Lung CA Screening    Artemio Harper MD  [facility]  "

## 2020-12-21 NOTE — PATIENT INSTRUCTIONS
Apps for relaxation  CALM  Headspace    OK to continue melatonin for sleep up to 10mg max a night.  Decrease caffeine to 2 pops a day.    Discussed medications and plan no medication at this point for mood, plan therapy and self care, improved sleep.    Preventive Health Recommendations  Male Ages 26 - 39    Yearly exam:             See your health care provider every year in order to  o   Review health changes.   o   Discuss preventive care.    o   Review your medicines if your doctor has prescribed any.    You should be tested each year for STDs (sexually transmitted diseases), if you re at risk.     After age 35, talk to your provider about cholesterol testing. If you are at risk for heart disease, have your cholesterol tested at least every 5 years.     If you are at risk for diabetes, you should have a diabetes test (fasting glucose).  Shots: Get a flu shot each year. Get a tetanus shot every 10 years.     Nutrition:    Eat at least 5 servings of fruits and vegetables daily.     Eat whole-grain bread, whole-wheat pasta and brown rice instead of white grains and rice.     Get adequate Calcium and Vitamin D.     Lifestyle    Exercise for at least 150 minutes a week (30 minutes a day, 5 days a week). This will help you control your weight and prevent disease.     Limit alcohol to one drink per day.     No smoking.     Wear sunscreen to prevent skin cancer.     See your dentist every six months for an exam and cleaning.     Patient Education     Insomnia  Insomnia is repeated difficulty going to sleep or staying asleep, or both. Whether you have insomnia is not defined by a specific amount of sleep. Different people need different amounts of sleep, and you may need more or less sleep at different times of your life.  There are 3 major types of insomnia: short-term, chronic, and  other.  Short-term, or acute insomnia lasts less than 3 months. The symptoms are temporary and can be linked directly to a stressor, such  as the death of a loved one, financial problems, or a new physical problem. Short-term insomnia stops when the stressor resolves or the person adapts to its presence.  Chronic insomnia occurs at least 3 times a week and lasts longer than 3 months. Chronic insomnia can occur when either the cause of the sleeping problem is not clear, or the insomnia does not get better when the stressor is resolved. A number of other criteria are also used to make the diagnosis of chronic insomnia.    Other insomnia  is the third type of insomnia-related sleep disorders. This description applies to people who have problems getting to sleep or staying asleep, but don't meet all of the factors that describe either short-term or chronic insomnia.    Many things cause insomnia. Different people may have different causes. It can be from an underlying medical or psychological condition, or lifestyle. It can also be primary insomnia, which means no cause can be found.  Causes of insomnia include:    Chronic medical problems- heart disease, gastrointestinal problems, hormonal changes, breathing problems    Anxiety    Stress    Depression    Pain    Work schedule    Sleep apnea    Illegal drugs    Certain medicines  Many different medicines can affect your sleep, such as stimulants, caffeine, alcohol, some decongestants, and diet pills. Other medicines may include some types of blood pressure pills, steroids, asthma medicines, antihistamines, antidepressants, seizure medicines and statins. Not all of these will affect your sleep, and they shouldn t be stopped without talking to your doctor.  Symptoms of insomnia can include:    Lying awake for long periods at night before falling asleep    Waking up several times during the night    Waking up early in the morning and not being able to get back to sleep    Feeling tired and not refreshed by sleep    Not being able to function properly during the day and finding it hard to  concentrate    Irritability    Tiredness and fatigue during the day  Home care    Review your medicines with your doctor or pharmacist to find out if they can cause insomnia. Not all medicines will affect your sleep, but they shouldn't be stopped without reviewing them with your doctor. There may be serious side effects and consequences from suddenly stopping your medicines. Not taking them may cause strokes, heart attacks, and many other problems.    Caffeine, smoking, and alcohol also affect sleep. Limit your daily use and don't use these before bedtime. Alcohol may make you sleepy at first, but as its effects wear off, you may awaken a few hours later and have trouble returning to sleep.    Don't exercise, eat or drink large amounts of liquid within 2 hours of your bedtime.    Improve your sleep habits. Have a fixed bed and wake-up time. Try to keep noise, light and heat in your bedroom at a comfortable level. Try using earplugs or eyeshades if needed.     Don't watch TV in bed.    If you don't fall asleep within 30 minutes, try to relax by reading or listening to soft music.    Limit daytime napping to one 30 minute period, early in the day.    Get regular exercise. Find other ways to lessen your stress level.    If a medicine was prescribed to help reset your sleep patterns, take it as directed. Sleeping pills are intended for short-term use, only. If taken for too long, the effect wears off while the risk of physical addiction and psychological dependence increases.  Sleep diary  If the cause isn t obvious and it is not improving, try keeping a  sleep diary  for a couple of weeks. Include in it:    The time you go to bed    How long it takes to fall asleep    How many times you wake up    What time you wake up    Your meal times and what you eat    What time you drink alcohol and caffeine    Your exercise habits and times  Follow-up care  Follow up with your healthcare provider, or as advised. If an X-ray or  CT scan was done, you will be notified if there is a change in the reading, especially if it affects treatment.  Call 911  Call 911 if any of these occur:    Trouble breathing    Confusion or trouble waking    Fainting or loss of consciousness    Rapid heart rate    New chest, arm, shoulder, neck or upper back pain    Trouble with speech or vision, weakness of an arm or leg    Trouble walking or talking, loss of balance, numbness or weakness in one side of your body, facial droop  When to seek medical advice  Call your healthcare provider right away if any of these occur:    Extreme restlessness or irritability    Confusion or hallucinations (seeing or hearing things that are not there)    Anxiety, depression    Several days without sleeping  [employer] last reviewed this educational content on 5/1/2018 2000-2020 [employer]. [address] [place] [address], [place] [address]. All rights reserved. This information is not intended as a substitute for professional medical care. Always follow your healthcare professional's instructions.           Patient Education     Treating Anxiety Disorders with Therapy    If you have an anxiety disorder, you don t have to suffer anymore. Treatment is available. Therapy (also called counseling) is often a helpful treatment for anxiety disorders. With therapy, a specially trained professional (therapist) helps you face and learn to manage your anxiety. Therapy can be short-term or long-term depending on your needs. In some cases, medicine may also be prescribed with therapy. It may take time before you notice how much therapy is helping, but stick with it. With therapy, you can feel better.  Cognitive behavioral therapy (CBT)  Cognitive behavioral therapy (CBT) teaches you to manage anxiety. It does this by helping you understand how you think and act when you re anxious. Research has shown CBT to be a very effective treatment for anxiety disorders. How CBT is run is almost  like a class. It involves homework and activities to build skills that teach you to cope with anxiety step by step. It can be done in a group or one-on-one, and often takes place for a set number of sessions. CBT has two main parts:    Cognitive therapy helps you identify the negative, irrational thoughts that occur with your anxiety. You ll learn to replace these with more positive, realistic thoughts.    Behavioral therapy helps you change how you react to anxiety. You ll learn coping skills and methods for relaxing to help you better deal with anxiety.  Other forms of therapy  Other therapy methods may work better for you than CBT. Or, you may move from CBT to another form of therapy as your treatment needs change. This may mean meeting with a therapist by yourself or in a group. Therapy can also help you work through problems in your life, such as drug or alcohol dependence, that may be making your anxiety worse.  Getting better takes time  Therapy will help you feel better and teach you skills to help manage anxiety long term. But change doesn t happen right away. It takes a commitment from you. And treatment only works if you learn to face the causes of your anxiety. So, you might feel worse before you feel better. This can sometimes make it hard to stick with it. But remember: Therapy is a very effective treatment. The results will be well worth it.  Helping yourself  If anxiety is wearing you down, here are some things you can do to cope:    Check with your doctor and rule out any physical problems that may be causing the anxiety symptoms.    If an anxiety disorder is diagnosed, seek mental healthcare. This is an illness and it can respond to treatment. Most types of anxiety disorders will respond to talk therapy and medicine.    Educate yourself about anxiety disorders. Keep track of helpful online resources and books you can use during stressful periods.    Try stress management techniques such as  meditation.    Consider online or in-person support groups.    Don t fight your feelings. Anxiety feeds itself. The more you worry about it, the worse it gets. Instead, try to identify what might have triggered your anxiety. Then try to put this threat in perspective.    Keep in mind that you can t control everything about a situation. Change what you can and let the rest take its course.    Exercise -- it s a great way to relieve tension and help your body feel relaxed.    Examine your life for stress, and try to find ways to reduce it.    Avoid caffeine and nicotine, which can make anxiety symptoms worse.    Fight the temptation to turn to alcohol or unprescribed drugs for relief. They only make things worse in the long run.   [employer] last reviewed this educational content on 1/1/2017 2000-2020 [employer]. [address] [place] [address], [place] [address]. All rights reserved. This information is not intended as a substitute for professional medical care. Always follow your healthcare professional's instructions.           Patient Education     Understanding Generalized Anxiety Disorder (ELLIE)    Anxiety can fill you with worry and fear. Sometimes anxiety is healthy. It alerts you to a potential threat and gets you to respond and take action. But for some people, anxiety gets so bad it causes problems in daily life. If you find yourself in a constant state of anxiety, you may have an anxiety disorder called generalized anxiety disorder (ELLIE). Speak with your healthcare provider or mental health professional to learn more. He or she can help.   What is generalized anxiety disorder?  ELLIE means that you are worrying constantly and can t control the worrying. Healthcare providers diagnose ELLIE when your worrying happens on most days and for at least 6 months.  With ELLIE, you might worry about money, your family and friends, work, or the world in general. You might not even be sure what you're anxious about.  But whatever it is, you have an intense fear that the worst will happen. These feelings never really go away. In people age 65 and older, ELLIE is one of the most commonly diagnosed anxiety disorders.  Many times it occurs with depression. This constant worry affects your quality of life and makes it hard to function. ELLIE can cause physical symptoms, too.  What are common symptoms of generalized anxiety disorder?  People with ELLIE often think they have a physical illness. The disorder can cause symptoms, such as:    Excessive worry that interferes with daily activities and lasts for at least 6 months    Muscle tension, especially in the neck and shoulders    Nausea and stomach problems    Frequent headaches    Feeling lightheaded    Restlessness, trouble sleeping    Feeling irritable and on edge all the time  How can generalized anxiety disorder be treated?  ELLIE can be treated with medicine or therapy (also called counseling), or both. Medicine helps to reduce symptoms, so you can continue with your daily routine. Therapy helps you understand the cause of your anxiety and learn how to manage it. Both forms of treatment help you deal with problems that anxiety causes in your life. This helps you to be healthier and happier.  [employer] last reviewed this educational content on 5/1/2017 2000-2020 [employer]. [address] [place] [address], [place] [address]. All rights reserved. This information is not intended as a substitute for professional medical care. Always follow your healthcare professional's instructions.

## 2020-12-22 ASSESSMENT — ANXIETY QUESTIONNAIRES: GAD7 TOTAL SCORE: 9

## 2020-12-27 ENCOUNTER — HEALTH MAINTENANCE LETTER (OUTPATIENT)
Age: 30
End: 2020-12-27

## 2021-01-08 PROBLEM — F65.9 PARAPHILIA: Status: ACTIVE | Noted: 2021-01-08

## 2021-01-13 ENCOUNTER — OFFICE VISIT (OUTPATIENT)
Dept: FAMILY MEDICINE | Facility: CLINIC | Age: 31
End: 2021-01-13
Payer: COMMERCIAL

## 2021-01-13 ENCOUNTER — TELEPHONE (OUTPATIENT)
Dept: FAMILY MEDICINE | Facility: CLINIC | Age: 31
End: 2021-01-13

## 2021-01-13 VITALS
WEIGHT: 170 LBS | RESPIRATION RATE: 16 BRPM | OXYGEN SATURATION: 98 % | DIASTOLIC BLOOD PRESSURE: 65 MMHG | SYSTOLIC BLOOD PRESSURE: 107 MMHG | BODY MASS INDEX: 23.03 KG/M2 | HEART RATE: 75 BPM | TEMPERATURE: 97.8 F | HEIGHT: 72 IN

## 2021-01-13 DIAGNOSIS — N48.9 LESION OF PENIS: Primary | ICD-10-CM

## 2021-01-13 PROCEDURE — 87491 CHLMYD TRACH DNA AMP PROBE: CPT | Performed by: FAMILY MEDICINE

## 2021-01-13 PROCEDURE — 87529 HSV DNA AMP PROBE: CPT | Mod: 59 | Performed by: FAMILY MEDICINE

## 2021-01-13 PROCEDURE — 87591 N.GONORRHOEAE DNA AMP PROB: CPT | Performed by: FAMILY MEDICINE

## 2021-01-13 PROCEDURE — 87529 HSV DNA AMP PROBE: CPT | Performed by: FAMILY MEDICINE

## 2021-01-13 PROCEDURE — 99213 OFFICE O/P EST LOW 20 MIN: CPT | Performed by: FAMILY MEDICINE

## 2021-01-13 ASSESSMENT — MIFFLIN-ST. JEOR: SCORE: 1774.11

## 2021-01-13 NOTE — TELEPHONE ENCOUNTER
Patient reports he was wearing pj pants w/o underwear and noticed the tip of his penis was sensitive - a stinging feeling.  He looked, saw 2 bumps which resemble a zit near urethra.  He is not certain if they were there before - he has not noticed because usually wears underwear.  He is sexually active - same partner x 10 years.  Denies hx of STDs for himself and partner.  Denies fever, blood in urine, discharge, pain with urination, pain during intercourse or any other symptoms at this time.  Appt made for tomorrow.  He will remain abstinent until he meets with MD. Rita WATT, MSN, RN

## 2021-01-13 NOTE — PATIENT INSTRUCTIONS
Today we did the swabs for gonorrhea and chlamydia and another for herpes.    It appear you had two pustules that are now broken open and scabbed over /healing with surrounding redness.    So if the tests are normal it could have been a clogged pore in the area or friction in the area that caused this and is now healing.    These tests take a few days to come back so avoid intercourse until we have results and the area is healed.

## 2021-01-13 NOTE — PROGRESS NOTES
Assessment & Plan     Ki was seen today for lesion.    Diagnoses and all orders for this visit:    Lesion of penis: just inside the urethra  Plan to rule out infection or herpes.  If normal then likely pustule that is healing now from friction or clogging of a gland.  -     Chlamydia trachomatis PCR  -     Neisseria gonorrhoeae PCR  -     HSV 1 and 2 DNA by PCR             See Patient Instructions    Return if symptoms worsen or fail to improve.    Artemio Harper MD  [facility]     Ki is a 30 year old who presents to clinic today for the following health issues     HPI     Bump on penis/ urethra: x 1 day, Soreness around the tip of the penis/ urethra. Patient states he saw one white spot by urethra. Patient states it is on the inside of the Urethra. Hard to see. Patient states today it seems like the spot is scabbed over. Maybe a little red or irritated around it today. Patient states sometimes he sleeps in pajama pants and does not wear underwear. Patient thinking maybe lint or something could have gotten in there. Patient is sexually active with same partner for the last 10 years. Patient states last time there was sexual activity there was no issue. Patient states last night and this morning the bump looked like a zit.    Patient states it does not hurt to pee, no blood in urine, no urinary frequency, no urinary symptoms. Just sore to the touch.  No discharge.      Review of Systems   Constitutional, HEENT, cardiovascular, pulmonary, gi and gu systems are negative, except as otherwise noted.      Objective    /65   Pulse 75   Temp 97.8  F (36.6  C) (Tympanic)   Resp 16   Ht 1.829 m (6')   Wt 77.1 kg (170 lb)   SpO2 98%   BMI 23.06 kg/m    Body mass index is 23.06 kg/m .  Physical Exam   GENERAL: healthy, alert and no distress   (male): testicles normal without atrophy or masses and penis normal just in the urethra on both sides are healed likely  pustules with mild surrounding irritation redness.

## 2021-01-14 LAB
C TRACH DNA SPEC QL NAA+PROBE: NEGATIVE
HSV1 DNA SPEC QL NAA+PROBE: NEGATIVE
HSV2 DNA SPEC QL NAA+PROBE: NEGATIVE
N GONORRHOEA DNA SPEC QL NAA+PROBE: NEGATIVE
SPECIMEN SOURCE: NORMAL

## 2021-03-10 ENCOUNTER — VIRTUAL VISIT (OUTPATIENT)
Dept: FAMILY MEDICINE | Facility: CLINIC | Age: 31
End: 2021-03-10
Payer: COMMERCIAL

## 2021-03-10 DIAGNOSIS — F32.1 CURRENT MODERATE EPISODE OF MAJOR DEPRESSIVE DISORDER WITHOUT PRIOR EPISODE (H): Primary | ICD-10-CM

## 2021-03-10 PROCEDURE — 99214 OFFICE O/P EST MOD 30 MIN: CPT | Mod: 95 | Performed by: FAMILY MEDICINE

## 2021-03-10 RX ORDER — SERTRALINE HYDROCHLORIDE 25 MG/1
TABLET, FILM COATED ORAL
Qty: 60 TABLET | Refills: 0 | Status: SHIPPED | OUTPATIENT
Start: 2021-03-10 | End: 2021-04-08

## 2021-03-10 ASSESSMENT — ANXIETY QUESTIONNAIRES
5. BEING SO RESTLESS THAT IT IS HARD TO SIT STILL: NOT AT ALL
6. BECOMING EASILY ANNOYED OR IRRITABLE: SEVERAL DAYS
3. WORRYING TOO MUCH ABOUT DIFFERENT THINGS: NOT AT ALL
1. FEELING NERVOUS, ANXIOUS, OR ON EDGE: SEVERAL DAYS
2. NOT BEING ABLE TO STOP OR CONTROL WORRYING: MORE THAN HALF THE DAYS
GAD7 TOTAL SCORE: 8
7. FEELING AFRAID AS IF SOMETHING AWFUL MIGHT HAPPEN: NEARLY EVERY DAY
IF YOU CHECKED OFF ANY PROBLEMS ON THIS QUESTIONNAIRE, HOW DIFFICULT HAVE THESE PROBLEMS MADE IT FOR YOU TO DO YOUR WORK, TAKE CARE OF THINGS AT HOME, OR GET ALONG WITH OTHER PEOPLE: SOMEWHAT DIFFICULT

## 2021-03-10 ASSESSMENT — PATIENT HEALTH QUESTIONNAIRE - PHQ9
SUM OF ALL RESPONSES TO PHQ QUESTIONS 1-9: 14
5. POOR APPETITE OR OVEREATING: SEVERAL DAYS

## 2021-03-10 NOTE — PROGRESS NOTES
Ki is a 30 year old who is being evaluated via a billable video visit.      How would you like to obtain your AVS? MyChart  If the video visit is dropped, the invitation should be resent by: Send to e-mail at: [email or web address]  Will anyone else be joining your video visit? No    10:06 am - provider entered chat room, waiting for patient's video to com up.    Video Start Time: 10:05 am    Assessment & Plan     Current moderate episode of major depressive disorder without prior episode (H)  Uncontrolled. Already pursuing CBT.  discussed options for medical treatment.   Discussed first line use of SSRIs, including expected benefit, and possible side effects.  Patient concurred to start with sertraline.  Continue CBT.  Suicidal precautions discussed.  Return precautions discussed and given to patient.   - sertraline (ZOLOFT) 25 MG tablet  Dispense: 60 tablet; Refill: 0      [facility]  [id number]}     Depression Screening Follow Up    PHQ 3/10/2021   PHQ-9 Total Score 14   Q9: Thoughts of better off dead/self-harm past 2 weeks Not at all     Last PHQ-9 3/10/2021   1.  Little interest or pleasure in doing things 1   2.  Feeling down, depressed, or hopeless 3   3.  Trouble falling or staying asleep, or sleeping too much 3   4.  Feeling tired or having little energy 1   5.  Poor appetite or overeating 2   6.  Feeling bad about yourself 3   7.  Trouble concentrating 1   8.  Moving slowly or restless 0   Q9: Thoughts of better off dead/self-harm past 2 weeks 0   PHQ-9 Total Score 14   Difficulty at work, home, or with people Somewhat difficult       Follow Up Actions Taken  Patient has experienced a recent significant life event.  Patient counseled, no additional follow up at this time.  Follow up recommended: pt has behaviort herapist he regularly sees  provider follow up in a few wekes     Patient Instructions     Start sertraline as we discussed today.  Expect improvement to be noticeable after several weeks.  Continue  behavior therapy.  Follow up with a virtual visit in a month.    See prompt medical attention if you develop suicidal thoughts, thoughts of hurting others or hallucination.    Patient Education     Depression  Depression is one of the most common mental health problems today. It is not just a state of unhappiness or sadness. It is a true disease. The cause seems to be linked to a drop in chemicals that transmit signals in the brain. Having a family history of depression, alcoholism, or suicide increases the risk. Chronic illness, chronic pain, migraine headaches, and high emotional stress also increase the risk.   Depression is something we tend to recognize in others. But we may have a hard time seeing it in ourselves. It can show in many physical and emotional ways:     Loss of appetite    Overeating    Not being able to sleep    Sleeping too much    Excessive tiredness not linked to physical exertion    Restlessness or irritability    Slowness of movement or speech    Feeling depressed or withdrawn    Loss of interest in things you once enjoyed    Trouble concentrating, remembering, or making decisions    Thoughts of harming or killing oneself, or thoughts that life is not worth living    Low self-esteem  The treatment for depression may include both medicine and psychotherapy. Antidepressants can reduce suffering. They can also improve the ability to function during the depressed period. Therapy can offer emotional support. It can also help you understand emotional factors that may be causing the depression.   Home care    Ongoing care and support help people manage this disease. Find a healthcare provider and therapist who meet your needs. Seek help when you feel like you may be getting ill.    Be kind to yourself. Make it a point to do things that you enjoy (gardening, walking in nature, going to a movie). Reward yourself for small successes.    Once you start your medicine, expect a slow decrease in your  symptoms. Depression will lift gradually, not right away. Ask your healthcare provider how long it will take for the medicine to start working.    Take care of your physical body. Eat a balanced diet (low in saturated fat and high in fruits and vegetables). Exercise at least 3 times a week for 30 minutes. Even mild to moderate exercise (like brisk walking) can make you feel better.    Don't make major decisions, such as a job change, a divorce, or a marriage until you feel better.    Don't drink alcohol. It can make depression worse.    Take medicine as prescribed. Don't stop your medicine or adjust the dose unless you talk with your healthcare provider.    Don't share your medicine or use someone else's medicine.    Tell all your healthcare providers about all the prescription and over-the-counter medicines, vitamins, and supplements you take. Certain supplements interact with medicines. They can cause dangerous side effects. Ask your pharmacist about medicine interactions when you have questions.    Talk with your family and trusted friends about your feelings and thoughts. Ask them to help you notice behavior changes early. You can then get help and, if needed, your medicine can be adjusted.    Talk with your healthcare provider if you are not getting better. He or she may change your medicine or have you try another treatment.    Follow-up care  Follow up with your healthcare provider as advised.   Call 911  Call 911 if you:     Have suicidal thoughts, a suicide plan, and the means to carry out the plan    Have serious thoughts of hurting someone else    Have trouble breathing    Are very confused    Feel very drowsy or have trouble awakening    Faint or lose consciousness    Have new chest pain that becomes more severe, lasts longer, or spreads into your shoulder, arm, neck, jaw, or back  When to seek medical advice  Call your healthcare provider right away if any of these happen:    Worsening of your  symptoms    Feeling extreme depression, fear, anxiety, or anger toward yourself or others    Feeling out of control    Feeling that you may try to harm yourself or another    Hearing voices that others do not hear    Seeing things that others do not see    Not sleeping or eating for 3 days in a row    Having friends or family express concern over your behavior and ask you to seek help  [employer] last reviewed this educational content on 7/1/2020 2000-2020 [employer]. All rights reserved. This information is not intended as a substitute for professional medical care. Always follow your healthcare professional's instructions.           Patient Education     Depression: Tips to Help Yourself    As your healthcare providers help treat your depression, you can also help yourself. Keep in mind that your illness affects you emotionally, physically, mentally, and socially. So full recovery will take time. Take care of your body and your soul, and be patient with yourself as you get better.  Self-care    Educate yourself. Read about treatment and medicine options. If you have the energy, attend local conferences or support groups. Keep a list of useful websites and helpful books and use them as needed. This illness is not your fault. Don t blame yourself for your depression.    Manage early symptoms. If you notice symptoms returning, experience triggers, or identify other factors that may lead to a depressive episode, get help as soon as possible. Ask trusted friends and family to monitor your behavior and let you know if they see anything of concern.    Work with your provider. Find a provider you can trust. Communicate honestly with that person and share information on your treatment for depression and your reaction to medicines.    Be prepared for a crisis. Know what to do if you experience a crisis. Keep the phone number of a crisis hotline and know the location of your community's urgent care centers  and the closest emergency department.    Hold off on big decisions. Depression can cloud your judgment. So wait until you feel better before making major life decisions, such as changing jobs, moving, or getting  or .    Be patient. Recovering from depression is a process. Don t be discouraged if it takes some time to feel better.    Keep it simple. Depression saps your energy and concentration. So you won t be able to do all the things you used to do. Set small goals and do what you can.    Be with others. Don t isolate yourself--you ll only feel worse. Try to be with other people. And take part in fun activities when you can. Go to a movie, [employer], Yaz[employer] service, or social event. Talk openly with people you can trust. And accept help when it s offered.    Take care of your body  People with depression often lose the desire to take care of themselves. That only makes their problems worse. During treatment and afterward, make a point to:    Exercise. It s a great way to take care of your body. And studies have shown that exercise helps fight depression. Aim for 30 minutes of moderate activity a day. Walking in small blocks of time (5-10 minutes) is a good way to start, but anything that gets you moving (gardening, house cleaning) counts.    Don't use drugs and alcohol. These may ease the pain in the short term. But they ll only make your problems worse in the long run.    Get relief from stress. Ask your healthcare provider for relaxation exercises and techniques to help relieve stress. Consider activities like meditation, yoga, or Luis Chi.    Eat right. A balanced and healthy diet helps keep your body healthy.    Get adequate sleep. Aim for 8 hours per night. Too much or too little sleep can cause other physical and emotional problems.  [employer] last reviewed this educational content on 12/1/2019 2000-2020 [employer]. All rights reserved. This information is not intended as a  substitute for professional medical care. Always follow your healthcare professional's instructions.           Patient Education     Text SUPPORT1 to 15768 to learn if you may be eligible for financial support with your medication(s).    Msg & Data Rates May Apply. Msg freq varies. Terms apply. Text HELP for help. Text STOP to end.   Sertraline tablets  Brand Name: Zoloft  What is this medicine?  SERTRALINE (SER tra lali) is used to treat depression. It may also be used to treat obsessive compulsive disorder, panic disorder, post-trauma stress, premenstrual dysphoric disorder (PMDD) or social anxiety.  How should I use this medicine?  Take this medicine by mouth with a glass of water. Follow the directions on the prescription label. You can take it with or without food. Take your medicine at regular intervals. Do not take your medicine more often than directed. Do not stop taking this medicine suddenly except upon the advice of your doctor. Stopping this medicine too quickly may cause serious side effects or your condition may worsen.  A special MedGuide will be given to you by the pharmacist with each prescription and refill. Be sure to read this information carefully each time.  Talk to your pediatrician regarding the use of this medicine in children. While this drug may be prescribed for children as young as 7 years for selected conditions, precautions do apply.  What side effects may I notice from receiving this medicine?  Side effects that you should report to your doctor or health care professional as soon as possible:    allergic reactions like skin rash, itching or hives, swelling of the face, lips, or tongue    anxious    black, tarry stools    changes in vision    confusion    elevated mood, decreased need for sleep, racing thoughts, impulsive behavior    eye pain    fast, irregular heartbeat    feeling faint or lightheaded, falls    feeling agitated, angry, or irritable    hallucination, loss of contact  with reality    loss of balance or coordination    loss of memory    painful or prolonged erections    restlessness, pacing, inability to keep still    seizures    stiff muscles    suicidal thoughts or other mood changes    trouble sleeping    unusual bleeding or bruising    unusually weak or tired    vomiting  Side effects that usually do not require medical attention (report to your doctor or health care professional if they continue or are bothersome):    change in appetite or weight    change in sex drive or performance    diarrhea    increased sweating    indigestion, nausea    tremors  What may interact with this medicine?  Do not take this medicine with any of the following medications:    cisapride    dofetilide    dronedarone    linezolid    MAOIs like Carbex, Eldepryl, Marplan, Nardil, and Parnate    methylene blue (injected into a vein)    pimozide    thioridazine  This medicine may also interact with the following medications:    alcohol    amphetamines    aspirin and aspirin-like medicines    certain medicines for depression, anxiety, or psychotic disturbances    certain medicines for fungal infections like ketoconazole, fluconazole, posaconazole, and itraconazole    certain medicines for irregular heart beat like flecainide, quinidine, propafenone    certain medicines for migraine headaches like almotriptan, eletriptan, frovatriptan, naratriptan, rizatriptan, sumatriptan, zolmitriptan    certain medicines for sleep    certain medicines for seizures like carbamazepine, valproic acid, phenytoin    certain medicines that treat or prevent blood clots like warfarin, enoxaparin, dalteparin    cimetidine    digoxin    diuretics    fentanyl    isoniazid    lithium    NSAIDs, medicines for pain and inflammation, like ibuprofen or naproxen    other medicines that prolong the QT interval (cause an abnormal heart rhythm)    rasagiline    safinamide    supplements like Coats's wort, kava kava,  valerian    tolbutamide    tramadol    tryptophan  What if I miss a dose?  If you miss a dose, take it as soon as you can. If it is almost time for your next dose, take only that dose. Do not take double or extra doses.  Where should I keep my medicine?  Keep out of the reach of children.  Store at room temperature between 15 and 30 degrees C (59 and 86 degrees F). Throw away any unused medicine after the expiration date.  What should I tell my health care provider before I take this medicine?  They need to know if you have any of these conditions:    bleeding disorders    bipolar disorder or a family history of bipolar disorder    glaucoma    heart disease    high blood pressure    history of irregular heartbeat    history of low levels of calcium, magnesium, or potassium in the blood    if you often drink alcohol    liver disease    receiving electroconvulsive therapy    seizures    suicidal thoughts, plans, or attempt; a previous suicide attempt by you or a family member    take medicines that treat or prevent blood clots    thyroid disease    an unusual or allergic reaction to sertraline, other medicines, foods, dyes, or preservatives    pregnant or trying to get pregnant    breast-feeding  What should I watch for while using this medicine?  Tell your doctor if your symptoms do not get better or if they get worse. Visit your doctor or health care professional for regular checks on your progress. Because it may take several weeks to see the full effects of this medicine, it is important to continue your treatment as prescribed by your doctor.  Patients and their families should watch out for new or worsening thoughts of suicide or depression. Also watch out for sudden changes in feelings such as feeling anxious, agitated, panicky, irritable, hostile, aggressive, impulsive, severely restless, overly excited and hyperactive, or not being able to sleep. If this happens, especially at the beginning of treatment or  after a change in dose, call your health care professional.  You may get drowsy or dizzy. Do not drive, use machinery, or do anything that needs mental alertness until you know how this medicine affects you. Do not stand or sit up quickly, especially if you are an older patient. This reduces the risk of dizzy or fainting spells. Alcohol may interfere with the effect of this medicine. Avoid alcoholic drinks.  Your mouth may get dry. Chewing sugarless gum or sucking hard candy, and drinking plenty of water may help. Contact your doctor if the problem does not go away or is severe.  NOTE:This sheet is a summary. It may not cover all possible information. If you have questions about this medicine, talk to your doctor, pharmacist, or health care provider. Copyright  2020 [employer]               Return in about 1 month (around 4/10/2021) for Follow up on depression treatment.    Yusuf Peraza MD  [facility] SOLO Emerson is a 30 year old who presents for the following health issues     HPI       Depression Followup    How are you doing with your depression since your last visit? Worsened     Are you having other symptoms that might be associated with depression? Yes:  loss of appetite, low energy, mood swings    Have you had a significant life event?  Relationship Concerns     Are you feeling anxious or having panic attacks?   Yes:  has had 2 panic attacks or anxiety    Do you have any concerns with your use of alcohol or other drugs? No    No previous medical treatment for any mental health diagnosis.    Social History     Tobacco Use     Smoking status: Never Smoker     Smokeless tobacco: Never Used   Substance Use Topics     Alcohol use: Yes     Drug use: No     PHQ 12/21/2020   PHQ-9 Total Score 13   Q9: Thoughts of better off dead/self-harm past 2 weeks Not at all     ELLIE-7 SCORE 12/21/2020   Total Score 9     Last PHQ-9 3/10/2021   1.  Little interest or pleasure in doing things 1    2.  Feeling down, depressed, or hopeless 3   3.  Trouble falling or staying asleep, or sleeping too much 3   4.  Feeling tired or having little energy 1   5.  Poor appetite or overeating 2   6.  Feeling bad about yourself 3   7.  Trouble concentrating 1   8.  Moving slowly or restless 0   Q9: Thoughts of better off dead/self-harm past 2 weeks 0   PHQ-9 Total Score 14   Difficulty at work, home, or with people Somewhat difficult     ELLIE-7  3/10/2021   1. Feeling nervous, anxious, or on edge 1   2. Not being able to stop or control worrying 2   3. Worrying too much about different things 0   4. Trouble relaxing 1   5. Being so restless that it is hard to sit still 0   6. Becoming easily annoyed or irritable 1   7. Feeling afraid, as if something awful might happen 3   ELLIE-7 Total Score 8   If you checked any problems, how difficult have they made it for you to do your work, take care of things at home, or get along with other people? Somewhat difficult       Suicide Assessment Five-step Evaluation and Treatment (SAFE-T)      How many servings of fruits and vegetables do you eat daily?  0-1    On average, how many sweetened beverages do you drink each day (Examples: soda, juice, sweet tea, etc.  Do NOT count diet or artificially sweetened beverages)?   1    How many days per week do you exercise enough to make your heart beat faster? 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     How many days per week do you miss taking your medication? n/a        Review of Systems   C: NEGATIVE for fever, chills, change in weight  I: NEGATIVE for worrisome rashes, moles or lesions  E: NEGATIVE for vision changes or irritation  E/M: NEGATIVE for ear, mouth and throat problems  R: NEGATIVE for significant cough or SOB  CV: NEGATIVE for chest pain, palpitations or peripheral edema  GI: NEGATIVE for nausea, abdominal pain, heartburn, or change in bowel  habits  : NEGATIVE for frequency, dysuria, or hematuria  N: NEGATIVE for weakness, dizziness or paresthesias  E: NEGATIVE for temperature intolerance, skin/hair changes  PSYCHIATRIC: see above      Objective           Vitals:  No vitals were obtained today due to virtual visit.    Physical Exam   GENERAL: Healthy, alert and no distress  EYES: Eyes grossly normal to inspection.  No discharge or erythema, or obvious scleral/conjunctival abnormalities.  RESP: No audible wheeze, cough, or visible cyanosis.  No visible retractions or increased work of breathing.    SKIN: Visible skin clear. No significant rash, abnormal pigmentation or lesions.  NEURO: Cranial nerves grossly intact.  Mentation and speech appropriate for age.  PSYCH: Mentation appears normal, affect normal/bright, judgement and insight intact, normal speech and appearance well-groomed.    No results found for any visits on 03/10/21.            Video-Visit Details    Type of service:  Video Visit    Video End Time:10:24 AM    Originating Location (pt. Location): Home    Distant Location (provider location):  [facility]     Platform used for Video Visit: [employer]

## 2021-03-10 NOTE — PATIENT INSTRUCTIONS
Start sertraline as we discussed today.  Expect improvement to be noticeable after several weeks.  Continue behavior therapy.  Follow up with a virtual visit in a month.    See prompt medical attention if you develop suicidal thoughts, thoughts of hurting others or hallucination.    Patient Education     Depression  Depression is one of the most common mental health problems today. It is not just a state of unhappiness or sadness. It is a true disease. The cause seems to be linked to a drop in chemicals that transmit signals in the brain. Having a family history of depression, alcoholism, or suicide increases the risk. Chronic illness, chronic pain, migraine headaches, and high emotional stress also increase the risk.   Depression is something we tend to recognize in others. But we may have a hard time seeing it in ourselves. It can show in many physical and emotional ways:     Loss of appetite    Overeating    Not being able to sleep    Sleeping too much    Excessive tiredness not linked to physical exertion    Restlessness or irritability    Slowness of movement or speech    Feeling depressed or withdrawn    Loss of interest in things you once enjoyed    Trouble concentrating, remembering, or making decisions    Thoughts of harming or killing oneself, or thoughts that life is not worth living    Low self-esteem  The treatment for depression may include both medicine and psychotherapy. Antidepressants can reduce suffering. They can also improve the ability to function during the depressed period. Therapy can offer emotional support. It can also help you understand emotional factors that may be causing the depression.   Home care    Ongoing care and support help people manage this disease. Find a healthcare provider and therapist who meet your needs. Seek help when you feel like you may be getting ill.    Be kind to yourself. Make it a point to do things that you enjoy (gardening, walking in nature, going to a  movie). Reward yourself for small successes.    Once you start your medicine, expect a slow decrease in your symptoms. Depression will lift gradually, not right away. Ask your healthcare provider how long it will take for the medicine to start working.    Take care of your physical body. Eat a balanced diet (low in saturated fat and high in fruits and vegetables). Exercise at least 3 times a week for 30 minutes. Even mild to moderate exercise (like brisk walking) can make you feel better.    Don't make major decisions, such as a job change, a divorce, or a marriage until you feel better.    Don't drink alcohol. It can make depression worse.    Take medicine as prescribed. Don't stop your medicine or adjust the dose unless you talk with your healthcare provider.    Don't share your medicine or use someone else's medicine.    Tell all your healthcare providers about all the prescription and over-the-counter medicines, vitamins, and supplements you take. Certain supplements interact with medicines. They can cause dangerous side effects. Ask your pharmacist about medicine interactions when you have questions.    Talk with your family and trusted friends about your feelings and thoughts. Ask them to help you notice behavior changes early. You can then get help and, if needed, your medicine can be adjusted.    Talk with your healthcare provider if you are not getting better. He or she may change your medicine or have you try another treatment.    Follow-up care  Follow up with your healthcare provider as advised.   Call 911  Call 911 if you:     Have suicidal thoughts, a suicide plan, and the means to carry out the plan    Have serious thoughts of hurting someone else    Have trouble breathing    Are very confused    Feel very drowsy or have trouble awakening    Faint or lose consciousness    Have new chest pain that becomes more severe, lasts longer, or spreads into your shoulder, arm, neck, jaw, or back  When to seek  medical advice  Call your healthcare provider right away if any of these happen:    Worsening of your symptoms    Feeling extreme depression, fear, anxiety, or anger toward yourself or others    Feeling out of control    Feeling that you may try to harm yourself or another    Hearing voices that others do not hear    Seeing things that others do not see    Not sleeping or eating for 3 days in a row    Having friends or family express concern over your behavior and ask you to seek help  [employer] last reviewed this educational content on 7/1/2020 2000-2020 [employer]. All rights reserved. This information is not intended as a substitute for professional medical care. Always follow your healthcare professional's instructions.           Patient Education     Depression: Tips to Help Yourself    As your healthcare providers help treat your depression, you can also help yourself. Keep in mind that your illness affects you emotionally, physically, mentally, and socially. So full recovery will take time. Take care of your body and your soul, and be patient with yourself as you get better.  Self-care    Educate yourself. Read about treatment and medicine options. If you have the energy, attend local conferences or support groups. Keep a list of useful websites and helpful books and use them as needed. This illness is not your fault. Don t blame yourself for your depression.    Manage early symptoms. If you notice symptoms returning, experience triggers, or identify other factors that may lead to a depressive episode, get help as soon as possible. Ask trusted friends and family to monitor your behavior and let you know if they see anything of concern.    Work with your provider. Find a provider you can trust. Communicate honestly with that person and share information on your treatment for depression and your reaction to medicines.    Be prepared for a crisis. Know what to do if you experience a crisis. Keep  the phone number of a crisis hotline and know the location of your community's urgent care centers and the closest emergency department.    Hold off on big decisions. Depression can cloud your judgment. So wait until you feel better before making major life decisions, such as changing jobs, moving, or getting  or .    Be patient. Recovering from depression is a process. Don t be discouraged if it takes some time to feel better.    Keep it simple. Depression saps your energy and concentration. So you won t be able to do all the things you used to do. Set small goals and do what you can.    Be with others. Don t isolate yourself--you ll only feel worse. Try to be with other people. And take part in fun activities when you can. Go to a movie, ballgame, S[employer] service, or social event. Talk openly with people you can trust. And accept help when it s offered.    Take care of your body  People with depression often lose the desire to take care of themselves. That only makes their problems worse. During treatment and afterward, make a point to:    Exercise. It s a great way to take care of your body. And studies have shown that exercise helps fight depression. Aim for 30 minutes of moderate activity a day. Walking in small blocks of time (5-10 minutes) is a good way to start, but anything that gets you moving (gardening, house cleaning) counts.    Don't use drugs and alcohol. These may ease the pain in the short term. But they ll only make your problems worse in the long run.    Get relief from stress. Ask your healthcare provider for relaxation exercises and techniques to help relieve stress. Consider activities like meditation, yoga, or Luis Chi.    Eat right. A balanced and healthy diet helps keep your body healthy.    Get adequate sleep. Aim for 8 hours per night. Too much or too little sleep can cause other physical and emotional problems.  [employer] last reviewed this educational content on  12/1/2019 2000-2020 [employer]. All rights reserved. This information is not intended as a substitute for professional medical care. Always follow your healthcare professional's instructions.           Patient Education     Text SUPPORT1 to 69415 to learn if you may be eligible for financial support with your medication(s).    Msg & Data Rates May Apply. Msg freq varies. Terms apply. Text HELP for help. Text STOP to end.   Sertraline tablets  Brand Name: Zoloft  What is this medicine?  SERTRALINE (SER tra lali) is used to treat depression. It may also be used to treat obsessive compulsive disorder, panic disorder, post-trauma stress, premenstrual dysphoric disorder (PMDD) or social anxiety.  How should I use this medicine?  Take this medicine by mouth with a glass of water. Follow the directions on the prescription label. You can take it with or without food. Take your medicine at regular intervals. Do not take your medicine more often than directed. Do not stop taking this medicine suddenly except upon the advice of your doctor. Stopping this medicine too quickly may cause serious side effects or your condition may worsen.  A special MedGuide will be given to you by the pharmacist with each prescription and refill. Be sure to read this information carefully each time.  Talk to your pediatrician regarding the use of this medicine in children. While this drug may be prescribed for children as young as 7 years for selected conditions, precautions do apply.  What side effects may I notice from receiving this medicine?  Side effects that you should report to your doctor or health care professional as soon as possible:    allergic reactions like skin rash, itching or hives, swelling of the face, lips, or tongue    anxious    black, tarry stools    changes in vision    confusion    elevated mood, decreased need for sleep, racing thoughts, impulsive behavior    eye pain    fast, irregular  heartbeat    feeling faint or lightheaded, falls    feeling agitated, angry, or irritable    hallucination, loss of contact with reality    loss of balance or coordination    loss of memory    painful or prolonged erections    restlessness, pacing, inability to keep still    seizures    stiff muscles    suicidal thoughts or other mood changes    trouble sleeping    unusual bleeding or bruising    unusually weak or tired    vomiting  Side effects that usually do not require medical attention (report to your doctor or health care professional if they continue or are bothersome):    change in appetite or weight    change in sex drive or performance    diarrhea    increased sweating    indigestion, nausea    tremors  What may interact with this medicine?  Do not take this medicine with any of the following medications:    cisapride    dofetilide    dronedarone    linezolid    MAOIs like Carbex, Eldepryl, Marplan, Nardil, and Parnate    methylene blue (injected into a vein)    pimozide    thioridazine  This medicine may also interact with the following medications:    alcohol    amphetamines    aspirin and aspirin-like medicines    certain medicines for depression, anxiety, or psychotic disturbances    certain medicines for fungal infections like ketoconazole, fluconazole, posaconazole, and itraconazole    certain medicines for irregular heart beat like flecainide, quinidine, propafenone    certain medicines for migraine headaches like almotriptan, eletriptan, frovatriptan, naratriptan, rizatriptan, sumatriptan, zolmitriptan    certain medicines for sleep    certain medicines for seizures like carbamazepine, valproic acid, phenytoin    certain medicines that treat or prevent blood clots like warfarin, enoxaparin, dalteparin    cimetidine    digoxin    diuretics    fentanyl    isoniazid    lithium    NSAIDs, medicines for pain and inflammation, like ibuprofen or naproxen    other medicines that prolong the QT interval  (cause an abnormal heart rhythm)    rasagiline    safinamide    supplements like Zimmerman's wort, kava kava, valerian    tolbutamide    tramadol    tryptophan  What if I miss a dose?  If you miss a dose, take it as soon as you can. If it is almost time for your next dose, take only that dose. Do not take double or extra doses.  Where should I keep my medicine?  Keep out of the reach of children.  Store at room temperature between 15 and 30 degrees C (59 and 86 degrees F). Throw away any unused medicine after the expiration date.  What should I tell my health care provider before I take this medicine?  They need to know if you have any of these conditions:    bleeding disorders    bipolar disorder or a family history of bipolar disorder    glaucoma    heart disease    high blood pressure    history of irregular heartbeat    history of low levels of calcium, magnesium, or potassium in the blood    if you often drink alcohol    liver disease    receiving electroconvulsive therapy    seizures    suicidal thoughts, plans, or attempt; a previous suicide attempt by you or a family member    take medicines that treat or prevent blood clots    thyroid disease    an unusual or allergic reaction to sertraline, other medicines, foods, dyes, or preservatives    pregnant or trying to get pregnant    breast-feeding  What should I watch for while using this medicine?  Tell your doctor if your symptoms do not get better or if they get worse. Visit your doctor or health care professional for regular checks on your progress. Because it may take several weeks to see the full effects of this medicine, it is important to continue your treatment as prescribed by your doctor.  Patients and their families should watch out for new or worsening thoughts of suicide or depression. Also watch out for sudden changes in feelings such as feeling anxious, agitated, panicky, irritable, hostile, aggressive, impulsive, severely restless, overly excited  and hyperactive, or not being able to sleep. If this happens, especially at the beginning of treatment or after a change in dose, call your health care professional.  You may get drowsy or dizzy. Do not drive, use machinery, or do anything that needs mental alertness until you know how this medicine affects you. Do not stand or sit up quickly, especially if you are an older patient. This reduces the risk of dizzy or fainting spells. Alcohol may interfere with the effect of this medicine. Avoid alcoholic drinks.  Your mouth may get dry. Chewing sugarless gum or sucking hard candy, and drinking plenty of water may help. Contact your doctor if the problem does not go away or is severe.  NOTE:This sheet is a summary. It may not cover all possible information. If you have questions about this medicine, talk to your doctor, pharmacist, or health care provider. Copyright  2020 [employer]

## 2021-03-11 ASSESSMENT — ANXIETY QUESTIONNAIRES: GAD7 TOTAL SCORE: 8

## 2021-04-08 ENCOUNTER — VIRTUAL VISIT (OUTPATIENT)
Dept: FAMILY MEDICINE | Facility: CLINIC | Age: 31
End: 2021-04-08
Payer: COMMERCIAL

## 2021-04-08 DIAGNOSIS — F32.1 CURRENT MODERATE EPISODE OF MAJOR DEPRESSIVE DISORDER WITHOUT PRIOR EPISODE (H): ICD-10-CM

## 2021-04-08 PROCEDURE — 99213 OFFICE O/P EST LOW 20 MIN: CPT | Mod: TEL | Performed by: NURSE PRACTITIONER

## 2021-04-08 PROCEDURE — 96127 BRIEF EMOTIONAL/BEHAV ASSMT: CPT | Mod: 59 | Performed by: NURSE PRACTITIONER

## 2021-04-08 ASSESSMENT — ANXIETY QUESTIONNAIRES
3. WORRYING TOO MUCH ABOUT DIFFERENT THINGS: NOT AT ALL
7. FEELING AFRAID AS IF SOMETHING AWFUL MIGHT HAPPEN: NOT AT ALL
5. BEING SO RESTLESS THAT IT IS HARD TO SIT STILL: NOT AT ALL
1. FEELING NERVOUS, ANXIOUS, OR ON EDGE: MORE THAN HALF THE DAYS
2. NOT BEING ABLE TO STOP OR CONTROL WORRYING: NOT AT ALL
IF YOU CHECKED OFF ANY PROBLEMS ON THIS QUESTIONNAIRE, HOW DIFFICULT HAVE THESE PROBLEMS MADE IT FOR YOU TO DO YOUR WORK, TAKE CARE OF THINGS AT HOME, OR GET ALONG WITH OTHER PEOPLE: NOT DIFFICULT AT ALL
6. BECOMING EASILY ANNOYED OR IRRITABLE: MORE THAN HALF THE DAYS
GAD7 TOTAL SCORE: 4

## 2021-04-08 ASSESSMENT — PATIENT HEALTH QUESTIONNAIRE - PHQ9
5. POOR APPETITE OR OVEREATING: NOT AT ALL
SUM OF ALL RESPONSES TO PHQ QUESTIONS 1-9: 12

## 2021-04-08 NOTE — PATIENT INSTRUCTIONS
Continue Zoloft 50 mg daily, if symptoms continue to improve follow up in 6 months, if you still feel that there is a room for improvement schedule visit in 4 weeks

## 2021-04-08 NOTE — PROGRESS NOTES
Ki is a 30 year old who is being evaluated via a billable telephone visit.          Assessment & Plan     Current moderate episode of major depressive disorder without prior episode (H)  -symptoms improving, no side effects  -recommended to continue Zoloft 50 mg daily, follow up as needed in 4-6 weeks  - sertraline (ZOLOFT) 50 MG tablet; Take 1 tablet (50 mg) by mouth daily      Depression Screening Follow Up    PHQ 4/8/2021   PHQ-9 Total Score 12   Q9: Thoughts of better off dead/self-harm past 2 weeks Not at all           See Patient Instructions    Return in about 4 weeks (around 5/6/2021), or if symptoms worsen or fail to improve.    [facility]   Ki is a 30 year old who presents for the following health issues     Chief Complaint   Patient presents with     Depression     Refill Request     Zoloft and needs to discuss if he should up the dose or not.          HPI     Depression Followup    How are you doing with your depression since your last visit? No change  states it has not gotten better but hasn't gotten any worse. At times he feels like his heart is racing, doesn't feel like a panick attack.     Are you having other symptoms that might be associated with depression? No    Have you had a significant life event?  No     Are you feeling anxious or having panic attacks?   No    Do you have any concerns with your use of alcohol or other drugs? No    Social History     Tobacco Use     Smoking status: Never Smoker     Smokeless tobacco: Never Used   Substance Use Topics     Alcohol use: Not Currently     Drug use: No     PHQ 12/21/2020 3/10/2021 4/8/2021   PHQ-9 Total Score 13 14 12   Q9: Thoughts of better off dead/self-harm past 2 weeks Not at all Not at all Not at all     ELLIE-7 SCORE 12/21/2020 3/10/2021 4/8/2021   Total Score 9 8 4     Last PHQ-9 4/8/2021   1.  Little interest or pleasure in doing things 1   2.  Feeling down, depressed, or  hopeless 2   3.  Trouble falling or staying asleep, or sleeping too much 3   4.  Feeling tired or having little energy 2   5.  Poor appetite or overeating 2   6.  Feeling bad about yourself 2   7.  Trouble concentrating 0   8.  Moving slowly or restless 0   Q9: Thoughts of better off dead/self-harm past 2 weeks 0   PHQ-9 Total Score 12   Difficulty at work, home, or with people Not difficult at all     ELLIE-7  4/8/2021   1. Feeling nervous, anxious, or on edge 2   2. Not being able to stop or control worrying 0   3. Worrying too much about different things 0   4. Trouble relaxing 0   5. Being so restless that it is hard to sit still 0   6. Becoming easily annoyed or irritable 2   7. Feeling afraid, as if something awful might happen 0   ELLIE-7 Total Score 4   If you checked any problems, how difficult have they made it for you to do your work, take care of things at home, or get along with other people? Not difficult at all         Review of Systems   Constitutional, HEENT, cardiovascular, pulmonary, gi and gu systems are negative, except as otherwise noted.      Objective           Vitals:  No vitals were obtained today due to virtual visit.    Physical Exam   GENERAL: Healthy, alert and no distress  PSYCH: Mentation appears normal, affect normal/bright, judgement and insight intact, normal speech       Total time: 10 minutes

## 2021-04-09 ASSESSMENT — ANXIETY QUESTIONNAIRES: GAD7 TOTAL SCORE: 4

## 2021-08-02 ENCOUNTER — TELEPHONE (OUTPATIENT)
Dept: PEDIATRICS | Facility: CLINIC | Age: 31
End: 2021-08-02
Payer: COMMERCIAL

## 2021-10-09 ENCOUNTER — HEALTH MAINTENANCE LETTER (OUTPATIENT)
Age: 31
End: 2021-10-09

## 2022-01-29 ENCOUNTER — HEALTH MAINTENANCE LETTER (OUTPATIENT)
Age: 32
End: 2022-01-29

## 2022-09-11 ENCOUNTER — HEALTH MAINTENANCE LETTER (OUTPATIENT)
Age: 32
End: 2022-09-11

## 2022-12-02 ENCOUNTER — E-VISIT (OUTPATIENT)
Dept: URGENT CARE | Facility: CLINIC | Age: 32
End: 2022-12-02
Payer: COMMERCIAL

## 2022-12-02 DIAGNOSIS — J06.9 VIRAL URI WITH COUGH: Primary | ICD-10-CM

## 2022-12-02 PROCEDURE — 99421 OL DIG E/M SVC 5-10 MIN: CPT | Performed by: NURSE PRACTITIONER

## 2022-12-03 ENCOUNTER — HOSPITAL ENCOUNTER (EMERGENCY)
Facility: CLINIC | Age: 32
Discharge: HOME | End: 2022-12-03
Attending: NURSE PRACTITIONER | Admitting: NURSE PRACTITIONER
Payer: COMMERCIAL

## 2022-12-03 VITALS
SYSTOLIC BLOOD PRESSURE: 117 MMHG | RESPIRATION RATE: 16 BRPM | OXYGEN SATURATION: 100 % | WEIGHT: 170 LBS | HEART RATE: 92 BPM | HEIGHT: 72 IN | TEMPERATURE: 98.7 F | BODY MASS INDEX: 23.03 KG/M2 | DIASTOLIC BLOOD PRESSURE: 63 MMHG

## 2022-12-03 DIAGNOSIS — J10.1 INFLUENZA A: ICD-10-CM

## 2022-12-03 DIAGNOSIS — J02.0 STREP THROAT: ICD-10-CM

## 2022-12-03 LAB
DEPRECATED S PYO AG THROAT QL EIA: POSITIVE
FLUAV RNA SPEC QL NAA+PROBE: POSITIVE
FLUBV RNA RESP QL NAA+PROBE: NEGATIVE
SARS-COV-2 RNA RESP QL NAA+PROBE: NEGATIVE

## 2022-12-03 PROCEDURE — C9803 HOPD COVID-19 SPEC COLLECT: HCPCS | Performed by: NURSE PRACTITIONER

## 2022-12-03 PROCEDURE — G0463 HOSPITAL OUTPT CLINIC VISIT: HCPCS | Mod: CS | Performed by: NURSE PRACTITIONER

## 2022-12-03 PROCEDURE — 87880 STREP A ASSAY W/OPTIC: CPT | Performed by: NURSE PRACTITIONER

## 2022-12-03 PROCEDURE — 87636 SARSCOV2 & INF A&B AMP PRB: CPT | Performed by: NURSE PRACTITIONER

## 2022-12-03 PROCEDURE — 99214 OFFICE O/P EST MOD 30 MIN: CPT | Mod: CS | Performed by: NURSE PRACTITIONER

## 2022-12-03 RX ORDER — AMOXICILLIN 500 MG/1
1000 CAPSULE ORAL 2 TIMES DAILY
Qty: 40 CAPSULE | Refills: 0 | Status: SHIPPED | OUTPATIENT
Start: 2022-12-03 | End: 2022-12-13

## 2022-12-03 RX ORDER — BENZONATATE 200 MG/1
200 CAPSULE ORAL 3 TIMES DAILY PRN
Qty: 30 CAPSULE | Refills: 0 | Status: SHIPPED | OUTPATIENT
Start: 2022-12-03 | End: 2024-02-09

## 2022-12-03 RX ORDER — ONDANSETRON 4 MG/1
4 TABLET, ORALLY DISINTEGRATING ORAL EVERY 8 HOURS PRN
Qty: 10 TABLET | Refills: 0 | Status: SHIPPED | OUTPATIENT
Start: 2022-12-03 | End: 2022-12-06

## 2022-12-03 ASSESSMENT — ENCOUNTER SYMPTOMS
CHILLS: 1
COUGH: 1
DIARRHEA: 0
SINUS PRESSURE: 0
SORE THROAT: 1
ARTHRALGIAS: 0
TROUBLE SWALLOWING: 0
DIZZINESS: 0
ABDOMINAL PAIN: 0
CONSTIPATION: 0
EYE DISCHARGE: 0
FATIGUE: 0
BLOOD IN STOOL: 0
JOINT SWELLING: 0
EYE REDNESS: 0
LIGHT-HEADEDNESS: 0
VOMITING: 1
MYALGIAS: 1
SINUS PAIN: 0
SHORTNESS OF BREATH: 0
NUMBNESS: 0
RHINORRHEA: 0
FEVER: 1
HEADACHES: 0
WEAKNESS: 0
NAUSEA: 1

## 2022-12-03 ASSESSMENT — ACTIVITIES OF DAILY LIVING (ADL): ADLS_ACUITY_SCORE: 35

## 2022-12-03 NOTE — Clinical Note
Ki Menjivar was seen and treated in our emergency department on 12/3/2022.         Sincerely,     [facility] Emergency [facility]

## 2022-12-03 NOTE — DISCHARGE INSTRUCTIONS
May use Tylenol severe cold and flu, Dayquil, Nyquil and ibuprofen. Be sure to not take more than 4000 mg of Tylenol in a 24 hour period. Rest and hydrate.   May use Prilosec for acid reflux concerns.  All of these medications can be found over the counter and no prescription needed.

## 2022-12-03 NOTE — ED PROVIDER NOTES
"  History     Chief Complaint   Patient presents with     Cough     Cough, body aches, vomiting starting on Wednesday.      Pharyngitis     Sore throat, nausea and \"acid reflux problems\" beginning Monday.     HPI  Ki Menjivar is a 31 year old male who presents to the urgent care for evaluation due to cough, body aches, chills, fever, sore throat, nausea, posttussive emesis for 4 days.  Sporadically using ibuprofen. Denies headache, dizziness, congestion, shortness of breath, chest pain, abdominal pain,  diarrhea, dysuria, hematuria and rash.       Allergies:  No Known Allergies    Problem List:    Patient Active Problem List    Diagnosis Date Noted     Paraphilia 01/08/2021     Priority: Medium     Family history of ankylosing spondylitis 09/24/2018     Priority: Medium        Past Medical History:    No past medical history on file.    Past Surgical History:    Past Surgical History:   Procedure Laterality Date     LEG SURGERY Right     femur fracture       Family History:    Family History   Problem Relation Age of Onset     Ankylosing Spondylitis Paternal Grandmother      Diabetes Paternal Grandfather      Coronary Artery Disease Maternal Grandfather      Ankylosing Spondylitis Other      Colon Cancer No family hx of      Prostate Cancer No family hx of        Social History:  Marital Status:   [2]  Social History     Tobacco Use     Smoking status: Never     Smokeless tobacco: Never   Substance Use Topics     Alcohol use: Not Currently     Drug use: No        Medications:    amoxicillin (AMOXIL) 500 MG capsule  benzonatate (TESSALON) 200 MG capsule  ondansetron (ZOFRAN ODT) 4 MG ODT tab  MELATONIN PO          Review of Systems   Constitutional: Positive for chills and fever. Negative for fatigue.   HENT: Positive for sore throat. Negative for congestion, ear discharge, ear pain, rhinorrhea, sinus pressure, sinus pain and trouble swallowing.    Eyes: Negative for discharge and redness.   Respiratory: " Positive for cough. Negative for shortness of breath.    Cardiovascular: Negative for chest pain.   Gastrointestinal: Positive for nausea and vomiting. Negative for abdominal pain, blood in stool, constipation and diarrhea.   Musculoskeletal: Positive for myalgias. Negative for arthralgias and joint swelling.   Skin: Negative for rash.   Neurological: Negative for dizziness, weakness, light-headedness, numbness and headaches.   All other systems reviewed and are negative.      Physical Exam   BP: 117/63  Pulse: 92  Temp: 98.7  F (37.1  C)  Resp: 16  Height: 182.9 cm (6')  Weight: 77.1 kg (170 lb)  SpO2: 100 %      Physical Exam  Constitutional:       General: He is not in acute distress.     Appearance: He is well-developed. He is ill-appearing. He is not toxic-appearing or diaphoretic.   HENT:      Right Ear: Tympanic membrane and ear canal normal.      Left Ear: Tympanic membrane and ear canal normal.      Nose: Congestion present.      Mouth/Throat:      Pharynx: Uvula midline. Posterior oropharyngeal erythema present.      Tonsils: Tonsillar exudate present. 1+ on the right. 1+ on the left.   Eyes:      Conjunctiva/sclera: Conjunctivae normal.      Pupils: Pupils are equal, round, and reactive to light.   Cardiovascular:      Rate and Rhythm: Normal rate and regular rhythm.      Pulses: Normal pulses.   Pulmonary:      Effort: Pulmonary effort is normal. No respiratory distress.      Breath sounds: Normal breath sounds and air entry. No decreased air movement. No decreased breath sounds, wheezing or rhonchi.   Abdominal:      General: There is no distension.      Palpations: Abdomen is soft.      Tenderness: There is no abdominal tenderness. There is no right CVA tenderness, left CVA tenderness, guarding or rebound.   Musculoskeletal:         General: Normal range of motion.      Cervical back: Normal range of motion and neck supple.   Skin:     General: Skin is warm.      Capillary Refill: Capillary refill  takes less than 2 seconds.   Neurological:      General: No focal deficit present.      Mental Status: He is alert and oriented to person, place, and time.   Psychiatric:         Mood and Affect: Mood normal.         ED Course                 Procedures           Results for orders placed or performed during the hospital encounter of 12/03/22 (from the past 24 hour(s))   Symptomatic; Unknown Influenza A/B & SARS-CoV2 (COVID-19) Virus PCR Multiplex Nasopharyngeal    Specimen: Nasopharyngeal; Swab   Result Value Ref Range    Influenza A PCR Positive (A) Negative    Influenza B PCR Negative Negative    SARS CoV2 PCR Negative Negative    Narrative    Testing was performed using the [employer]ille SARS-CoV-2 & Influenza A/B Assay on the tennille Indu System. This test should be ordered for the detection of SARS-CoV-2 and influenza viruses in individuals who meet clinical and/or epidemiological criteria. Test performance is unknown in asymptomatic patients. This test is for in vitro diagnostic use under the FDA EUA for laboratories certified under CLIA to perform moderate and/or high complexity testing. This test has not been FDA cleared or approved. A negative result does not rule out the presence of PCR inhibitors in the specimen or target RNA in concentration below the limit of detection for the assay. If only one viral target is positive but coinfection with multiple targets is suspected, the sample should be re-tested with another FDA cleared, approved or authorized test, if coinfection would change clinical management. [facility] are certified under the Clinical Laboratory Improvement Amendments of 1988 (CLIA-88) as qualified to perform moderate and/or high complexity laboratory testing.   Streptococcus A Rapid Scr w Reflx to PCR    Specimen: Throat; Swab   Result Value Ref Range    Group A Strep antigen Positive (A) Negative       Medications - No data to display    Assessments & Plan (with Medical Decision  Making)   Ki Menjivar is a 31 year old male who presents to the urgent care for evaluation due to cough, body aches, chills, fever, sore throat, nausea, posttussive emesis for 4 days. Vital signs normal, no worrisome findings on physical exam.  Rapid strep positive, prescription for amoxicillin sent.  Influenza A positive.  Discussed viral etiology and average course of illness.  Zofran and Tessalon Perles sent. May use over the counter medications as needed and appropriate. Increase rest and hydration. Return precautions reviewed, all questions answered. Patient agreeable to plan of care and discharged in good condition.    I have reviewed the nursing notes.    I have reviewed the findings, diagnosis, plan and need for follow up with the patient.      Discharge Medication List as of 12/3/2022  3:04 PM      START taking these medications    Details   amoxicillin (AMOXIL) 500 MG capsule Take 2 capsules (1,000 mg) by mouth 2 times daily for 10 days, Disp-40 capsule, R-0, E-Prescribe      benzonatate (TESSALON) 200 MG capsule Take 1 capsule (200 mg) by mouth 3 times daily as needed for cough, Disp-30 capsule, R-0, E-Prescribe             Final diagnoses:   Strep throat   Influenza A       12/3/2022   [facility] EMERGENCY DEPT     Jenny Johns, APRN CNP  12/03/22 1530

## 2022-12-03 NOTE — PATIENT INSTRUCTIONS
Dear Ki Menjivar    I ordered a strep and flu test for you. If you feel this can not wait till tomorrow you will have to go to the ER    Thanks for choosing us as your health care partner,    Nelli Hope, CNP

## 2023-05-06 ENCOUNTER — HEALTH MAINTENANCE LETTER (OUTPATIENT)
Age: 33
End: 2023-05-06

## 2024-02-08 ENCOUNTER — TELEPHONE (OUTPATIENT)
Dept: FAMILY MEDICINE | Facility: CLINIC | Age: 34
End: 2024-02-08
Payer: COMMERCIAL

## 2024-02-08 NOTE — TELEPHONE ENCOUNTER
Pt called back.  Appt tomorrow with Eliza.  Used a same day.    Reminded pt that if his symptoms worsen, he will need to be evaluated in the ED.    Pt agrees.    Pinky Dunlap RN

## 2024-02-08 NOTE — TELEPHONE ENCOUNTER
"S-(situation): Pt seeking recommendation regarding throat symptoms.     B-(background): Pt states that he sneezed very forcefully 2 or 3 days ago, and says that it \"felt like something popped in my throat\". Says that he was feeling ok the last day or two, but feels like his symptoms are slightly worse today.     A-(assessment): Pt denies breathing difficulty or fever. Says that there's mild to moderate discomfort with swallowing and eating noted. States that he feels like something's pinched in his [place] apple. Rates his discomfort with swallowing 5/10.    R-(recommendations): Pt trying to determine if these symptoms can be monitored at home, or if this is something that he should be seen for. Routing to PCP for review/recommendation.    Marlene Pham RN  [facility]  "

## 2024-02-08 NOTE — TELEPHONE ENCOUNTER
Eliza Soto APRN CNP Buche, Amber D, RN1 hour ago (1:15 PM)     OF  I am not patient PCP, saw him virtually once in 2021    Recommend appointment.    ROCKY Nelson CNP

## 2024-02-09 ENCOUNTER — OFFICE VISIT (OUTPATIENT)
Dept: FAMILY MEDICINE | Facility: CLINIC | Age: 34
End: 2024-02-09
Payer: COMMERCIAL

## 2024-02-09 VITALS
HEART RATE: 127 BPM | OXYGEN SATURATION: 96 % | TEMPERATURE: 103.3 F | DIASTOLIC BLOOD PRESSURE: 70 MMHG | RESPIRATION RATE: 16 BRPM | SYSTOLIC BLOOD PRESSURE: 98 MMHG

## 2024-02-09 DIAGNOSIS — J02.0 STREPTOCOCCAL PHARYNGITIS: ICD-10-CM

## 2024-02-09 DIAGNOSIS — J06.9 UPPER RESPIRATORY TRACT INFECTION, UNSPECIFIED TYPE: Primary | ICD-10-CM

## 2024-02-09 LAB
DEPRECATED S PYO AG THROAT QL EIA: POSITIVE
FLUAV AG SPEC QL IA: NEGATIVE
FLUBV AG SPEC QL IA: NEGATIVE

## 2024-02-09 PROCEDURE — 87880 STREP A ASSAY W/OPTIC: CPT | Performed by: NURSE PRACTITIONER

## 2024-02-09 PROCEDURE — 87635 SARS-COV-2 COVID-19 AMP PRB: CPT | Performed by: NURSE PRACTITIONER

## 2024-02-09 PROCEDURE — 99213 OFFICE O/P EST LOW 20 MIN: CPT | Performed by: NURSE PRACTITIONER

## 2024-02-09 PROCEDURE — 87804 INFLUENZA ASSAY W/OPTIC: CPT | Performed by: NURSE PRACTITIONER

## 2024-02-09 RX ORDER — BENZONATATE 100 MG/1
100 CAPSULE ORAL 3 TIMES DAILY PRN
Qty: 21 CAPSULE | Refills: 0 | Status: SHIPPED | OUTPATIENT
Start: 2024-02-09

## 2024-02-09 RX ORDER — AMOXICILLIN 500 MG/1
500 CAPSULE ORAL 2 TIMES DAILY
Qty: 20 CAPSULE | Refills: 0 | Status: SHIPPED | OUTPATIENT
Start: 2024-02-09 | End: 2024-02-19

## 2024-02-09 ASSESSMENT — PAIN SCALES - GENERAL: PAINLEVEL: NO PAIN (0)

## 2024-02-09 NOTE — LETTER
February 9, 2024      Ki Menjivar  [address] [place] [id number]        To Whom It May Concern:    Ki Menjivar was seen in our clinic. He may return to work on Feb 14 th     Sincerely,      ROCKY Nelson CNP

## 2024-02-09 NOTE — PROGRESS NOTES
"  Assessment & Plan     Upper respiratory tract infection, unspecified type  -discussed symptomatic treatment, Tylenol, Ibuprofen as needed   -he tested positive for strep, I sent prescription for Amoxicillin   -work letter provided, return on Feb 14 th, if he is feeling better, he may return sooner as long as he afebrile   - Streptococcus A Rapid Screen w/Reflex to PCR - Clinic Collect  - Influenza A & B Antigen - Clinic Collect  - Symptomatic COVID-19 Virus (Coronavirus) by PCR; Future  - benzonatate (TESSALON) 100 MG capsule; Take 1 capsule (100 mg) by mouth 3 times daily as needed for cough  - Symptomatic COVID-19 Virus (Coronavirus) by PCR Nose    Streptococcal pharyngitis    - amoxicillin (AMOXIL) 500 MG capsule; Take 1 capsule (500 mg) by mouth 2 times daily for 10 days        Kaushal Emerson is a 33 year old, presenting for the following health issues:  Throat Problem        2/9/2024    11:38 AM   Additional Questions   Roomed by aleisha   Accompanied by sself         2/9/2024    11:38 AM   Patient Reported Additional Medications   Patient reports taking the following new medications none     Pt states that he sneezed very forcefully 2 or 3 days ago, and says that it \"felt like something popped in my throat\". Says that he was feeling ok the last day or two, but feels like his symptoms are slightly worse today.      Says that there's mild to moderate discomfort with swallowing and eating noted. States that he feels like something's pinched in his [place]. Rates his discomfort with swallowing 5/10.    Last night at 4am he states he couldn't sleep, had joint pain, went to get some water and was dry heaving, did vomit today and has a fever of 103.3  HPI           Review of Systems  Constitutional, HEENT, cardiovascular, pulmonary, gi and gu systems are negative, except as otherwise noted.      Objective    BP 98/70   Pulse (!) 127   Temp (!) 103.3  F (39.6  C) (Tympanic)   Resp 16   SpO2 96%   There " is no height or weight on file to calculate BMI.  Physical Exam   GENERAL: alert and no distress  EYES: Eyes grossly normal to inspection, PERRL and conjunctivae and sclerae normal  HENT: normal cephalic/atraumatic, ear canals and TM's normal, and tonsillar erythema  NECK: bilateral anterior cervical adenopathy, no asymmetry, masses, or scars, and thyroid normal to palpation  RESP: lungs clear to auscultation - no rales, rhonchi or wheezes  CV: regular rate and rhythm, normal S1 S2, no S3 or S4, no murmur, click or rub, no peripheral edema   PSYCH: mentation appears normal, affect normal/bright    Results for orders placed or performed in visit on 02/09/24 (from the past 24 hour(s))   Influenza A & B Antigen - Clinic Collect    Specimen: Nose; Swab   Result Value Ref Range    Influenza A antigen Negative Negative    Influenza B antigen Negative Negative    Narrative    Test results must be correlated with clinical data. If necessary, results should be confirmed by a molecular assay or viral culture.   Streptococcus A Rapid Screen w/Reflex to PCR - Clinic Collect    Specimen: Throat; Swab   Result Value Ref Range    Group A Strep antigen Positive (A) Negative           Signed Electronically by: ROCKY Nelson CNP

## 2024-02-09 NOTE — PATIENT INSTRUCTIONS
Tylenol, Ibuprofen as needed    Tessalon 100 mg 3 times daily as needed for cough     Drink more fluids

## 2024-02-10 LAB — SARS-COV-2 RNA RESP QL NAA+PROBE: NEGATIVE

## 2024-02-11 ENCOUNTER — NURSE TRIAGE (OUTPATIENT)
Dept: NURSING | Facility: CLINIC | Age: 34
End: 2024-02-11
Payer: COMMERCIAL

## 2024-02-11 NOTE — TELEPHONE ENCOUNTER
Wife calling for patient who tested positive for strep on Friday. Patient started antibiotic and has had 4 doses. Patient states that his throat is not sore but the top of his mouth is now sore and is unable to talk or swallow.   Asked to speak to patient to triage symptoms. Patient reports that the throat pain is improved but now having pain in the top of his mouth and the very top of his throat.   Patient states that he has been having vomiting and gagging but associated with the antibiotic. Patient has been drinking water and is able to speak to me but it is painful. Patient so far has had a cup of water today. Patient reports some lightheadedness if he is on his feet too long and will need to sit down but is walking independently.   Temperature right now is 99 but has ibuprofen in his system.   Protocol recommends home care   Patient care advice given. Patient will call back with worsening symptoms.   Chaparrita Spence RN   02/11/24 11:10 AM  [facility] Nurse Advisor    Reason for Disposition   [1] Strep throat AND [2] taking an antibiotic   [1] Taking antibiotics < 48 hours for strep throat AND [2] fever persists    Additional Information   Negative: SEVERE difficulty breathing (e.g., struggling for each breath, speaks in single words, stridor)   Negative: Sounds like a life-threatening emergency to the triager   Negative: [1] Drooling or spitting out saliva (because can't swallow) AND [2] new-onset   Negative: Unable to open mouth completely   Negative: [1] Difficulty breathing AND [2] not severe   Negative: [1] Fever > 103 F (39.4 C) AND [2] taking antibiotic > 24 hours   Negative: [1] Drinking very little AND [2] dehydration suspected (e.g., no urine > 12 hours, very dry mouth, very lightheaded)   Negative: [1] Refuses to drink anything AND [2] for > 12 hours   Negative: Patient sounds very sick or weak to the triager   Negative: [1] Taking antibiotic > 24 hours for strep throat AND [2] sore throat pain is  SEVERE   Negative: SEVERE difficulty breathing (e.g., struggling for each breath, speaks in single words)   Negative: Sounds like a life-threatening emergency to the triager   Negative: [1] Recent hospitalization for pneumonia AND [2] taking an antibiotic   Negative: [1] Animal bite infection AND [2] taking an antibiotic   Negative: [1] Cellulitis AND [2] taking an antibiotic   Negative: [1] Diarrhea AND [2] taking an antibiotic   Negative: [1] Ear  infection (Otitis Media) AND [2] taking an antibiotic   Negative: [1] Ear  infection (Swimmer's Ear) AND [2] taking an antibiotic   Negative: [1] Sinus infection AND [2] taking an antibiotic   Negative: [1] Taking antibiotic > 48 hours (2 days) for strep throat AND [2] fever persists   Negative: [1] Taking antibiotic > 72 hours (3 days) for strep throat AND [2] sore throat not improved    Protocols used: Infection on Antibiotic Follow-up Call-A-, Strep Throat Infection on Antibiotic Follow-up Call-A-

## 2024-07-13 ENCOUNTER — HEALTH MAINTENANCE LETTER (OUTPATIENT)
Age: 34
End: 2024-07-13

## 2025-07-19 ENCOUNTER — HEALTH MAINTENANCE LETTER (OUTPATIENT)
Age: 35
End: 2025-07-19

## 2025-09-01 ENCOUNTER — NURSE TRIAGE (OUTPATIENT)
Dept: NURSING | Facility: CLINIC | Age: 35
End: 2025-09-01
Payer: COMMERCIAL